# Patient Record
Sex: FEMALE | Race: WHITE | NOT HISPANIC OR LATINO | Employment: OTHER | URBAN - METROPOLITAN AREA
[De-identification: names, ages, dates, MRNs, and addresses within clinical notes are randomized per-mention and may not be internally consistent; named-entity substitution may affect disease eponyms.]

---

## 2021-12-01 PROBLEM — Z01.419 WELL WOMAN EXAM: Status: ACTIVE | Noted: 2021-12-01

## 2021-12-02 ENCOUNTER — TELEPHONE (OUTPATIENT)
Dept: OBSTETRICS AND GYNECOLOGY | Facility: CLINIC | Age: 56
End: 2021-12-02

## 2021-12-02 ENCOUNTER — OFFICE VISIT (OUTPATIENT)
Dept: OBSTETRICS AND GYNECOLOGY | Facility: CLINIC | Age: 56
End: 2021-12-02

## 2021-12-02 VITALS
BODY MASS INDEX: 24.75 KG/M2 | DIASTOLIC BLOOD PRESSURE: 80 MMHG | RESPIRATION RATE: 14 BRPM | SYSTOLIC BLOOD PRESSURE: 122 MMHG | WEIGHT: 175 LBS

## 2021-12-02 DIAGNOSIS — Z12.31 ENCOUNTER FOR SCREENING MAMMOGRAM FOR MALIGNANT NEOPLASM OF BREAST: ICD-10-CM

## 2021-12-02 DIAGNOSIS — Z01.419 WELL WOMAN EXAM: Primary | ICD-10-CM

## 2021-12-02 DIAGNOSIS — Z71.85 VACCINE COUNSELING: ICD-10-CM

## 2021-12-02 PROCEDURE — 99386 PREV VISIT NEW AGE 40-64: CPT | Performed by: OBSTETRICS & GYNECOLOGY

## 2021-12-02 RX ORDER — ESTRADIOL 0.03 MG/D
FILM, EXTENDED RELEASE TRANSDERMAL
COMMUNITY
End: 2021-12-02

## 2021-12-02 RX ORDER — ESTRADIOL 1 MG/1
1 TABLET ORAL DAILY
Qty: 90 TABLET | Refills: 4 | Status: SHIPPED | OUTPATIENT
Start: 2021-12-02 | End: 2022-09-04 | Stop reason: SDUPTHER

## 2021-12-02 RX ORDER — RIZATRIPTAN BENZOATE 10 MG/1
TABLET ORAL
COMMUNITY

## 2021-12-02 RX ORDER — PROGESTERONE 100 MG/1
CAPSULE ORAL
COMMUNITY
End: 2021-12-02

## 2021-12-02 RX ORDER — ALPRAZOLAM 0.5 MG/1
TABLET ORAL
COMMUNITY
Start: 2021-11-29 | End: 2022-04-21 | Stop reason: SDUPTHER

## 2021-12-02 RX ORDER — IBUPROFEN 800 MG/1
TABLET ORAL
COMMUNITY
End: 2022-06-08

## 2021-12-02 RX ORDER — ESTROGEN,CON/M-PROGEST ACET 0.625-2.5
1 TABLET ORAL DAILY
Qty: 90 TABLET | Refills: 4 | Status: SHIPPED | OUTPATIENT
Start: 2021-12-02 | End: 2021-12-02

## 2021-12-02 RX ORDER — ZOLPIDEM TARTRATE 10 MG/1
TABLET ORAL
COMMUNITY
End: 2022-04-21 | Stop reason: SDUPTHER

## 2021-12-02 RX ORDER — TOPIRAMATE 50 MG/1
TABLET, FILM COATED ORAL
COMMUNITY

## 2021-12-02 RX ORDER — PROGESTERONE 100 MG/1
100 CAPSULE ORAL DAILY
Qty: 90 CAPSULE | Refills: 4 | Status: SHIPPED | OUTPATIENT
Start: 2021-12-02 | End: 2022-03-09 | Stop reason: SINTOL

## 2021-12-02 RX ORDER — FLUTICASONE PROPIONATE 50 MCG
SPRAY, SUSPENSION (ML) NASAL
COMMUNITY

## 2021-12-02 NOTE — PATIENT INSTRUCTIONS
Zoster Vaccine, Recombinant injection (Shingrix)      What is this medicine?  ZOSTER VACCINE (ZOS ter vak SEEN) is used to prevent shingles in adults 50 years old and over. This vaccine is not used to treat shingles or nerve pain from shingles.  This medicine may be used for other purposes; ask your health care provider or pharmacist if you have questions.    What should I tell my health care provider before I take this medicine?  They need to know if you have any of these conditions:  • blood disorders or disease  • cancer like leukemia or lymphoma  • immune system problems or therapy  • an unusual or allergic reaction to vaccines, other medications, foods, dyes, or preservatives  • pregnant or trying to get pregnant  • breast-feeding    How should I use this medicine?  1. This vaccine is for injection in a muscle. It is given by a health care professional.  2. The vaccine series requires 2 doses for full effect  3. The second dose should be given somewhere between 2-6 months after the initial injection is given.    What if I miss a dose?  • Keep appointments for follow-up (booster) doses as directed. It is important not to miss your dose.   • Call your doctor or health care professional if you are unable to keep an appointment.    What may interact with this medicine?  • medicines that suppress your immune system  • medicines to treat cancer  • steroid medicines like prednisone or cortisone    This list may not describe all possible interactions. Give your health care provider a list of all the medicines, herbs, non-prescription drugs, or dietary supplements you use. Also tell them if you smoke, drink alcohol, or use illegal drugs. Some items may interact with your medicine.    What should I watch for while using this medicine?  • Visit your doctor for regular check ups.  • This vaccine, like all vaccines, may not fully protect everyone.    What side effects may I notice from receiving this medicine?  Side effects  that you should report to your doctor or health care professional as soon as possible:  • allergic reactions like skin rash, itching or hives, swelling of the face, lips, or tongue  • breathing problems  • Side effects that usually do not require medical attention (report these to your doctor or health care professional if they continue or are bothersome):  • chills  • headache  • fever  • nausea, vomiting  • redness, warmth, pain, swelling or itching at site where injected  • tiredness  This list may not describe all possible side effects. Call your doctor for medical advice about side effects. You may report side effects to FDA at 7-620-IYY-5436.    Where should I keep my medicine?  This vaccine is only given in a clinic, pharmacy, doctor's office, or other health care setting and will not be stored at home.  NOTE: This sheet is a summary. It may not cover all possible information. If you have questions about this medicine, talk to your doctor, pharmacist, or health care provider.  © 2019 [employer]/Gold Standard (2018-07-30 13:20:30)

## 2021-12-02 NOTE — TELEPHONE ENCOUNTER
Sending in a prescription for a generic estrogen tablet along with the same progesterone tablet she used to take.    New Medications Ordered This Visit   Medications   • estradiol (Estrace) 1 MG tablet     Sig: Take 1 tablet by mouth Daily.     Dispense:  90 tablet     Refill:  4   • Progesterone (Prometrium) 100 MG capsule     Sig: Take 1 capsule by mouth Daily.     Dispense:  90 capsule     Refill:  4

## 2021-12-02 NOTE — PROGRESS NOTES
Subjective   Chief Complaint   Patient presents with   • Gynecologic Exam     Tracey Shukla is a 56 y.o. year old  menopausal female presenting to be seen for her annual exam. She comes today as a new patient. She is having no problems. She does take hormone replacement therapy. She is on compounded estrogen as well as progesterone capsules. She is unsure why she is taking the combination of by asked with testosterone added. Previously she was getting gynecologic care in [place], [place]. She has had a Pap and a mammogram both within the last couple of years. She not sure when her last colonoscopy was done but she knows she is up-to-date.    There have been no bleeding over the year.    SEXUAL Hx:  She is currently sexually active.  In the past year there there has been NO new sexual partners.    Condoms are never used.  She would not like to be screened for STD's at today's exam.  [place] is painful: no  HEALTH Hx:  She exercises regularly: yes.  She wears her seat belt: yes.  She has concerns about domestic violence: no.  She has noticed changes in height: no.  OTHER THINGS SHE WANTS TO DISCUSS TODAY:  Nothing else    The following portions of the patient's history were reviewed and updated as appropriate:problem list, current medications, allergies, past family history, past medical history, past social history and past surgical history.    Social History    Tobacco Use      Smoking status: Never Smoker      Smokeless tobacco: Not on file      Review of Systems  Constitutional POS: nothing reported    NEG: anorexia or night sweats   Genitourinary POS: nothing reported    NEG: dysuria or hematuria      Gastointestinal POS: nothing reported    NEG: bloating, change in bowel habits, melena or reflux symptoms   Integument POS: nothing reported    NEG: moles that are changing in size, shape, color or rashes   Breast POS: nothing reported    NEG: persistent breast lump, skin dimpling or nipple discharge         Objective   /80   Resp 14   Wt 79.4 kg (175 lb)   Breastfeeding No   BMI 24.75 kg/m²     General:  well developed; well nourished  no acute distress   Skin:  No suspicious lesions seen   Thyroid: normal to inspection and palpation   Breasts:  Examined in supine position  Symmetric without masses or skin dimpling  Nipples normal without inversion, lesions or discharge  There are no palpable axillary nodes   Abdomen: soft, non-tender; no masses  no umbilical or inguinal hernias are present  no hepato-splenomegaly   Pelvis: Clinical staff was present for exam  External genitalia:  normal appearance of the external genitalia including Bartholin's and Tavernier's glands.  :  urethral meatus normal;  Vaginal:  normal pink mucosa without prolapse or lesions.  Cervix:  normal appearance.  Uterus:  normal size, shape and consistency.  Adnexa:  normal bimanual exam of the adnexa.  Rectal:  digital rectal exam not performed; anus visually normal appearing.        Assessment   1. Normal GYN exam in menopause  2. Menopausal female currently on HRT - without significant symptoms affecting activities of daily living  3. She is up to date on all relevant gynecologic and colorectal screenings     Plan   1. Pap was not done today.  I explained to Tracey that the recommendations for Pap smear interval in a low risk patient has lengthened to 3 years time.  I told Tracey she still needs to be seen in our office yearly for a full physical including breast and pelvic exam.  2. She was encouraged to get yearly mammograms.  She should report any palpable breast lump(s) or skin changes regardless of mammographic findings.  I explained to Tracey that notification regarding her mammogram results will come from the center performing the study.  Our office will not be routinely calling with mammogram results.  It is her responsibility to make sure that the results from the mammogram are communicated to her by the breast center.  If she  has any questions about the results, she is welcome to call our office anytime.  3. Data from the women's health initiative study was reviewed.  With Prempro versus placebo, it was explained that there was no significant difference in the rates of stroke, heart attack or breast cancer until greater than 5 years of use.  The magnitude of risk after 5 years of use was approximately 7 additional cases of breast cancer, heart attack and stroke per 10,000 women years of use.  When the data was reanalyzed including only those women initiating HRT within close proximity of the natural menopause, only the rate of stroke persisted.  Furthermore, data is only available for Prempro.  Any extrapolation to other forms of hormone therapy cannot accurately say whether the risks are equal, less for more than with the medications studied.  Ultimately, if she wishes to use hormone replacement therapy, the goal would be to try to reduce it to the lowest possible dose.  Preferably, the goal would be total withdrawal of systemic hormone therapy by 5 years.  There is no good prospective data to quantify the risk for use of HRT for greater than 6 years.  4. The following data needs to be obtained to update her medical records: last PAP, last mammogram and last colonoscopy.  5. I discussed with Tracey that she may be behind on needed vaccinations for Shingles [Shingrix].  She may be able to obtain these vaccinations at her local pharmacy OR speak about obtaining them with her primary care.  If she does obtain her vaccines, I have asked Tracey to let us know the date each vaccine was obtained so that her medical record could be updated in our system.  6. The importance of keeping all planned follow-up and taking all medications as prescribed was emphasized.  7. Follow up for annual exam 1 year    New Medications Ordered This Visit   Medications   • estrogen, conjugated,-medroxyprogesterone (Prempro) 0.625-2.5 MG per tablet     Sig: Take 1  tablet by mouth Daily.     Dispense:  90 tablet     Refill:  4          This note was electronically signed.    Jose Maria Hines M.D.  December 2, 2021    Part of this note may be an electronic transcription/translation of spoken language to printed text using the Dragon Dictation System.

## 2021-12-02 NOTE — TELEPHONE ENCOUNTER
Spoke to pt. Pt states that she cannot do anything that is going to be really expensive so she would like to do the oral estradiol combined with an oral progesterone.

## 2021-12-02 NOTE — TELEPHONE ENCOUNTER
Please call patient and let her know we received notification from her pharmacy that Prempro was not a covered prescription.  We can file a prior authorization and see if we can get that covered.  It may be easier to use oral estradiol combined with an oral progesterone.  It may be less expensive for her to go that route.  After talking to her if that is what she wants me to do, let me know and I will send appropriate prescription.  Thank you

## 2022-03-08 ENCOUNTER — TELEPHONE (OUTPATIENT)
Dept: OBSTETRICS AND GYNECOLOGY | Facility: CLINIC | Age: 57
End: 2022-03-08

## 2022-03-08 NOTE — TELEPHONE ENCOUNTER
PLEASE CALL, HAS SOME QUESTIONS ABOUT HER HORMONE REPLACEMENT MEDS-HAVING A LOT OF MENOPAUSAL SYMPTOMS SINCE CHANGING

## 2022-03-08 NOTE — TELEPHONE ENCOUNTER
This is roughly equivalent dose of the hormone she was taking in Prempro.  It is 2 different forms of hormone replacement therapy.  The estrogen and progesterone are a little different in the 2 medicines.  If she would like, we can prescribe Prempro again.  Just let me know

## 2022-03-08 NOTE — TELEPHONE ENCOUNTER
Patient called stating since change of hormones in December she is having increased mood swings, moodiness, irritable, breast tenderness and low energy.  Currently taking Estradiol 1 mg and Progesterone 100 mg.    [facility]

## 2022-03-09 ENCOUNTER — TELEPHONE (OUTPATIENT)
Dept: OBSTETRICS AND GYNECOLOGY | Facility: CLINIC | Age: 57
End: 2022-03-09

## 2022-03-09 RX ORDER — MEDROXYPROGESTERONE ACETATE 2.5 MG/1
2.5 TABLET ORAL DAILY
Qty: 90 TABLET | Refills: 1 | Status: SHIPPED | OUTPATIENT
Start: 2022-03-09 | End: 2022-09-04 | Stop reason: SDUPTHER

## 2022-03-09 NOTE — TELEPHONE ENCOUNTER
Might suggest trying Provera instead of Prometrium and keeping the estradiol unchanged.  If she wants me to do that, let me know

## 2022-03-09 NOTE — TELEPHONE ENCOUNTER
Attempted to call patient back to discuss her concerns about hormone replacement therapy.  Again left a message on the machine and asked her to call me back.

## 2022-03-10 NOTE — TELEPHONE ENCOUNTER
Spoke with patient and she stated that Dr. Hines had already been in contact with her and has already sent in the Provera.

## 2022-04-21 ENCOUNTER — OFFICE VISIT (OUTPATIENT)
Dept: FAMILY MEDICINE CLINIC | Facility: CLINIC | Age: 57
End: 2022-04-21

## 2022-04-21 VITALS
BODY MASS INDEX: 24.81 KG/M2 | OXYGEN SATURATION: 98 % | SYSTOLIC BLOOD PRESSURE: 112 MMHG | HEIGHT: 71 IN | DIASTOLIC BLOOD PRESSURE: 82 MMHG | WEIGHT: 177.2 LBS | TEMPERATURE: 97.7 F | RESPIRATION RATE: 15 BRPM | HEART RATE: 82 BPM

## 2022-04-21 DIAGNOSIS — Z79.899 HIGH RISK MEDICATION USE: ICD-10-CM

## 2022-04-21 DIAGNOSIS — F41.9 ANXIETY: ICD-10-CM

## 2022-04-21 DIAGNOSIS — G43.909 MIGRAINE WITHOUT STATUS MIGRAINOSUS, NOT INTRACTABLE, UNSPECIFIED MIGRAINE TYPE: ICD-10-CM

## 2022-04-21 DIAGNOSIS — M62.838 MUSCLE SPASM: ICD-10-CM

## 2022-04-21 DIAGNOSIS — F51.01 PRIMARY INSOMNIA: Primary | ICD-10-CM

## 2022-04-21 LAB
POC AMPHETAMINES: NEGATIVE
POC BARBITURATES: NEGATIVE
POC BENZODIAZEPHINES: NEGATIVE
POC COCAINE: NEGATIVE
POC METHADONE: NEGATIVE
POC METHAMPHETAMINE SCREEN URINE: NEGATIVE
POC OPIATES: NEGATIVE
POC OXYCODONE: NEGATIVE
POC PHENCYCLIDINE: NEGATIVE
POC PROPOXYPHENE: NEGATIVE
POC THC: NEGATIVE
POC TRICYCLIC ANTIDEPRESSANTS: NEGATIVE

## 2022-04-21 PROCEDURE — 99214 OFFICE O/P EST MOD 30 MIN: CPT | Performed by: FAMILY MEDICINE

## 2022-04-21 PROCEDURE — 80305 DRUG TEST PRSMV DIR OPT OBS: CPT | Performed by: FAMILY MEDICINE

## 2022-04-21 RX ORDER — ZOLPIDEM TARTRATE 10 MG/1
10 TABLET ORAL NIGHTLY PRN
Qty: 30 TABLET | Refills: 2 | Status: SHIPPED | OUTPATIENT
Start: 2022-04-21 | End: 2022-10-18 | Stop reason: SDUPTHER

## 2022-04-21 RX ORDER — CYCLOBENZAPRINE HCL 10 MG
10 TABLET ORAL NIGHTLY PRN
Qty: 30 TABLET | Refills: 0 | Status: SHIPPED | OUTPATIENT
Start: 2022-04-21 | End: 2022-10-18 | Stop reason: SDUPTHER

## 2022-04-21 RX ORDER — PROGESTERONE 100 MG/1
1 CAPSULE ORAL DAILY
COMMUNITY
End: 2022-09-06

## 2022-04-21 RX ORDER — CYCLOBENZAPRINE HCL 10 MG
1 TABLET ORAL AS NEEDED
COMMUNITY
End: 2022-04-21 | Stop reason: SDUPTHER

## 2022-04-21 RX ORDER — ALPRAZOLAM 0.5 MG/1
0.5 TABLET ORAL 3 TIMES DAILY PRN
Qty: 60 TABLET | Refills: 0 | Status: SHIPPED | OUTPATIENT
Start: 2022-04-21 | End: 2022-10-07 | Stop reason: SDUPTHER

## 2022-04-21 RX ORDER — FREMANEZUMAB-VFRM 225 MG/1.5ML
225 INJECTION SUBCUTANEOUS
COMMUNITY

## 2022-04-21 NOTE — PROGRESS NOTES
New Patient Office Visit      Patient Name: Tracey Shukla  : 1965   MRN: [id number]     Chief Complaint:    Chief Complaint   Patient presents with   • Anxiety     Pt requests med refills        History of Present Illness: Tracey Shukla is a 57 y.o. female who is here today to   follow-up on her insomnia, anxiety, migraines and also her muck boots have rubbed her left anterior leg and will get a little swollen.  She since is gotten some padding and there wore thicker socks and has gone down a little bit she just wants me to look at it rather quickly.  Still enjoys cleaning horse stalls    Subjective      Review of Systems:   Review of Systems   Constitutional: Negative for fatigue and fever.   Respiratory: Negative for cough and shortness of breath.    Cardiovascular: Negative for chest pain and palpitations.   Skin: Negative for rash.       Past Medical History:   Past Medical History:   Diagnosis Date   • Anxiety - PCP manages    • Diverticulosis    • Frozen shoulder     RIGHT   • Goiter    • H/O migraine    • Insomnia        Past Surgical History:   Past Surgical History:   Procedure Laterality Date   • CLOSED MANIPULATION SHOULDER Right    • COLONOSCOPY      10 YEARS   • HERNIA REPAIR     • INCISIONAL HERNIA REPAIR     • LAPAROSCOPIC SALPINGOOPHERECTOMY Left     Enlarged ovary - benign (Dr. Mir López)   • OOPHORECTOMY Left    • SHOULDER SURGERY Right     FROZEN SHOULDER SURGERY   • THYROID SURGERY      Goiter removed   • TUBAL ABDOMINAL LIGATION Bilateral        Family History:   Family History   Problem Relation Age of Onset   • Lung cancer Mother    • Depression Father        Social History:   Social History     Socioeconomic History   • Marital status:    Tobacco Use   • Smoking status: Never Smoker   • Smokeless tobacco: Never Used   Vaping Use   • Vaping Use: Never used   Substance and Sexual Activity   • Alcohol use: Yes     Alcohol/week: 1.0 standard drink      "Types: 1 Glasses of wine per week     Comment: daily   • Drug use: Never   • Sexual activity: Defer       Medications:     Current Outpatient Medications:   •  ALPRAZolam (XANAX) 0.5 MG tablet, Take 1 tablet by mouth 3 (Three) Times a Day As Needed for Anxiety., Disp: 60 tablet, Rfl: 0  •  cyclobenzaprine (FLEXERIL) 10 MG tablet, Take 1 tablet by mouth At Night As Needed for Muscle Spasms., Disp: 30 tablet, Rfl: 0  •  estradiol (Estrace) 1 MG tablet, Take 1 tablet by mouth Daily., Disp: 90 tablet, Rfl: 4  •  fluticasone (FLONASE) 50 MCG/ACT nasal spray, fluticasone propionate 50 mcg/actuation nasal spray,suspension, Disp: , Rfl:   •  Fremanezumab-vfrm (Ajovy) 225 MG/1.5ML solution auto-injector, Inject 225 mg under the skin into the appropriate area as directed Every 30 (Thirty) Days., Disp: , Rfl:   •  ibuprofen (ADVIL,MOTRIN) 800 MG tablet, ibuprofen 800 mg tablet, Disp: , Rfl:   •  medroxyPROGESTERone (Provera) 2.5 MG tablet, Take 1 tablet by mouth Daily., Disp: 90 tablet, Rfl: 1  •  Progesterone (PROMETRIUM) 100 MG capsule, Take 1 capsule by mouth Daily., Disp: , Rfl:   •  rizatriptan (MAXALT) 10 MG tablet, rizatriptan 10 mg tablet, Disp: , Rfl:   •  sertraline (ZOLOFT) 50 MG tablet, , Disp: , Rfl:   •  topiramate (TOPAMAX) 50 MG tablet, topiramate 50 mg tablet, Disp: , Rfl:   •  zolpidem (AMBIEN) 10 MG tablet, Take 1 tablet by mouth At Night As Needed for Sleep., Disp: 30 tablet, Rfl: 2    Allergies:   No Known Allergies    Objective     Physical Exam:  Vital Signs:   Vitals:    04/21/22 1313   BP: 112/82   BP Location: Left arm   Patient Position: Sitting   Cuff Size: Adult   Pulse: 82   Resp: 15   Temp: 97.7 °F (36.5 °C)   SpO2: 98%   Weight: 80.4 kg (177 lb 3.2 oz)   Height: 179.1 cm (70.5\")   PainSc: 0-No pain     Body mass index is 25.07 kg/m².        Physical Exam  Vitals and nursing note reviewed.   Constitutional:       Appearance: Normal appearance.   HENT:      Head: Normocephalic and atraumatic. "   Cardiovascular:      Rate and Rhythm: Normal rate and regular rhythm.   Pulmonary:      Effort: Pulmonary effort is normal.      Breath sounds: Normal breath sounds.   Musculoskeletal:         General: Normal range of motion.      Cervical back: Normal range of motion and neck supple.      Right lower leg: No edema.      Left lower leg: No edema.      Comments: She does have a small area of soft tissue swelling over the distal anterior tibia down by the ankle.  I think it is a resolving hematoma.  She will observe it and if is not better in a few weeks with taking off the pressure and consider getting rid of the boots will let me know   Skin:     General: Skin is warm and dry.   Neurological:      General: No focal deficit present.      Mental Status: She is alert.         Procedures    Assessment / Plan      Assessment/Plan:   Diagnoses and all orders for this visit:    1. Primary insomnia (Primary)  -     zolpidem (AMBIEN) 10 MG tablet; Take 1 tablet by mouth At Night As Needed for Sleep.  Dispense: 30 tablet; Refill: 2    2. Anxiety  -     ALPRAZolam (XANAX) 0.5 MG tablet; Take 1 tablet by mouth 3 (Three) Times a Day As Needed for Anxiety.  Dispense: 60 tablet; Refill: 0    3. High risk medication use  -     POC Urine Drug Screen, Triage    4. Muscle spasm  -     cyclobenzaprine (FLEXERIL) 10 MG tablet; Take 1 tablet by mouth At Night As Needed for Muscle Spasms.  Dispense: 30 tablet; Refill: 0         Meds refilled for her as requested.    Careful observation on the left leg if not better in a few weeks return.    We will give her a shot of Aimovig here that she brought with her.  For her migraines.  As she is stable on this medication.    Get UDS and consent form signed [place] is appropriate.  Follow-up in 3 months may do CBC and CMP at that point as last blood work was done in December.       Follow Up:   Return in about 3 months (around 7/21/2022) for Recheck, Labs prior next visit.    Alfredito Rocha MD  [facility]  Primary Care [facility]

## 2022-05-23 ENCOUNTER — CLINICAL SUPPORT (OUTPATIENT)
Dept: FAMILY MEDICINE CLINIC | Facility: CLINIC | Age: 57
End: 2022-05-23

## 2022-05-23 DIAGNOSIS — G43.911 INTRACTABLE MIGRAINE WITH STATUS MIGRAINOSUS, UNSPECIFIED MIGRAINE TYPE: Primary | ICD-10-CM

## 2022-06-08 RX ORDER — IBUPROFEN 800 MG/1
TABLET ORAL
Qty: 90 TABLET | Refills: 0 | Status: SHIPPED | OUTPATIENT
Start: 2022-06-08 | End: 2022-10-24

## 2022-06-08 NOTE — TELEPHONE ENCOUNTER
Rx Refill Note    Requested Prescriptions     Pending Prescriptions Disp Refills   • ibuprofen (ADVIL,MOTRIN) 800 MG tablet [Pharmacy Med Name: IBUPROFEN 800 MG TABLET] 90 tablet 0     Sig: TAKE ONE TABLET BY MOUTH THREE TIMES A DAY WITH FOOD        Last office visit with prescribing clinician: 4/21/2022      Next office visit with prescribing clinician: N/A  Last labs: 04/21/2022  Last refill: 12/23/2021

## 2022-06-21 ENCOUNTER — CLINICAL SUPPORT (OUTPATIENT)
Dept: FAMILY MEDICINE CLINIC | Facility: CLINIC | Age: 57
End: 2022-06-21

## 2022-06-21 DIAGNOSIS — G43.909 MIGRAINE WITHOUT STATUS MIGRAINOSUS, NOT INTRACTABLE, UNSPECIFIED MIGRAINE TYPE: Primary | ICD-10-CM

## 2022-06-21 PROCEDURE — 99211 OFF/OP EST MAY X REQ PHY/QHP: CPT | Performed by: FAMILY MEDICINE

## 2022-07-22 ENCOUNTER — CLINICAL SUPPORT (OUTPATIENT)
Dept: FAMILY MEDICINE CLINIC | Facility: CLINIC | Age: 57
End: 2022-07-22
Payer: COMMERCIAL

## 2022-07-22 DIAGNOSIS — G43.909 MIGRAINE WITHOUT STATUS MIGRAINOSUS, NOT INTRACTABLE, UNSPECIFIED MIGRAINE TYPE: Primary | ICD-10-CM

## 2022-08-25 ENCOUNTER — CLINICAL SUPPORT (OUTPATIENT)
Dept: FAMILY MEDICINE CLINIC | Facility: CLINIC | Age: 57
End: 2022-08-25

## 2022-08-25 DIAGNOSIS — G43.911 INTRACTABLE MIGRAINE WITH STATUS MIGRAINOSUS, UNSPECIFIED MIGRAINE TYPE: Primary | ICD-10-CM

## 2022-08-25 PROCEDURE — 95115 IMMUNOTHERAPY ONE INJECTION: CPT | Performed by: FAMILY MEDICINE

## 2022-08-30 ENCOUNTER — OFFICE VISIT (OUTPATIENT)
Dept: FAMILY MEDICINE CLINIC | Facility: CLINIC | Age: 57
End: 2022-08-30

## 2022-08-30 VITALS — OXYGEN SATURATION: 99 % | HEART RATE: 68 BPM | TEMPERATURE: 97.5 F

## 2022-08-30 DIAGNOSIS — R05.9 COUGH: Primary | ICD-10-CM

## 2022-08-30 DIAGNOSIS — J01.10 ACUTE NON-RECURRENT FRONTAL SINUSITIS: ICD-10-CM

## 2022-08-30 LAB
EXPIRATION DATE: NORMAL
FLUAV AG UPPER RESP QL IA.RAPID: NOT DETECTED
FLUBV AG UPPER RESP QL IA.RAPID: NOT DETECTED
INTERNAL CONTROL: NORMAL
Lab: NORMAL
SARS-COV-2 AG UPPER RESP QL IA.RAPID: NOT DETECTED

## 2022-08-30 PROCEDURE — 99214 OFFICE O/P EST MOD 30 MIN: CPT | Performed by: FAMILY MEDICINE

## 2022-08-30 PROCEDURE — 87428 SARSCOV & INF VIR A&B AG IA: CPT | Performed by: FAMILY MEDICINE

## 2022-08-30 RX ORDER — FLUCONAZOLE 150 MG/1
150 TABLET ORAL ONCE
Qty: 1 TABLET | Refills: 0 | Status: SHIPPED | OUTPATIENT
Start: 2022-08-30 | End: 2022-08-30

## 2022-08-30 RX ORDER — GUAIFENESIN AND CODEINE PHOSPHATE 100; 10 MG/5ML; MG/5ML
SOLUTION ORAL
Qty: 240 ML | Refills: 0 | Status: SHIPPED | OUTPATIENT
Start: 2022-08-30 | End: 2022-10-18

## 2022-08-30 RX ORDER — AMOXICILLIN AND CLAVULANATE POTASSIUM 875; 125 MG/1; MG/1
1 TABLET, FILM COATED ORAL 2 TIMES DAILY
Qty: 20 TABLET | Refills: 0 | Status: SHIPPED | OUTPATIENT
Start: 2022-08-30 | End: 2022-10-18

## 2022-08-30 NOTE — PROGRESS NOTES
Follow Up Office Visit      Patient Name: Tracey Shukla  : 1965   MRN: [id number]     Chief Complaint:    Chief Complaint   Patient presents with   • URI     +1 week       History of Present Illness: Tracey Shukla is a 57 y.o. female who is here today to   evaluated for cough and drainage sinus pressure going on over a week now just cannot get better she is tried Mucinex she is tried Stahist she is tried Cherry-Winter she is tried Robitussin all the over-the-counter medicines.  Just not getting better.  Her COVID swab and flu swab was negative today patient examined in the parking lot.  She denies any fever mostly sinus issue and congestion drainage with some cough keeping her awake at night.    Subjective      Review of Systems:   Review of Systems    Past Medical History:   Past Medical History:   Diagnosis Date   • Anxiety - PCP manages    • Diverticulosis    • Frozen shoulder     RIGHT   • Goiter    • H/O migraine    • Insomnia        Past Surgical History:   Past Surgical History:   Procedure Laterality Date   • CLOSED MANIPULATION SHOULDER Right    • COLONOSCOPY      10 YEARS   • HERNIA REPAIR     • INCISIONAL HERNIA REPAIR     • LAPAROSCOPIC SALPINGOOPHERECTOMY Left     Enlarged ovary - benign (Dr. Mir López)   • OOPHORECTOMY Left    • SHOULDER SURGERY Right     FROZEN SHOULDER SURGERY   • THYROID SURGERY      Goiter removed   • TUBAL ABDOMINAL LIGATION Bilateral        Family History:   Family History   Problem Relation Age of Onset   • Lung cancer Mother    • Depression Father        Social History:   Social History     Socioeconomic History   • Marital status:    Tobacco Use   • Smoking status: Never Smoker   • Smokeless tobacco: Never Used   Vaping Use   • Vaping Use: Never used   Substance and Sexual Activity   • Alcohol use: Yes     Alcohol/week: 1.0 standard drink     Types: 1 Glasses of wine per week     Comment: daily   • Drug use: Never   • Sexual  activity: Defer       Medications:     Current Outpatient Medications:   •  ALPRAZolam (XANAX) 0.5 MG tablet, Take 1 tablet by mouth 3 (Three) Times a Day As Needed for Anxiety., Disp: 60 tablet, Rfl: 0  •  amoxicillin-clavulanate (Augmentin) 875-125 MG per tablet, Take 1 tablet by mouth 2 (Two) Times a Day., Disp: 20 tablet, Rfl: 0  •  cyclobenzaprine (FLEXERIL) 10 MG tablet, Take 1 tablet by mouth At Night As Needed for Muscle Spasms., Disp: 30 tablet, Rfl: 0  •  estradiol (Estrace) 1 MG tablet, Take 1 tablet by mouth Daily., Disp: 90 tablet, Rfl: 4  •  fluconazole (Diflucan) 150 MG tablet, Take 1 tablet by mouth 1 (One) Time for 1 dose., Disp: 1 tablet, Rfl: 0  •  fluticasone (FLONASE) 50 MCG/ACT nasal spray, fluticasone propionate 50 mcg/actuation nasal spray,suspension, Disp: , Rfl:   •  Fremanezumab-vfrm (Ajovy) 225 MG/1.5ML solution auto-injector, Inject 225 mg under the skin into the appropriate area as directed Every 30 (Thirty) Days., Disp: , Rfl:   •  guaiFENesin-codeine (GUAIFENESIN AC) 100-10 MG/5ML liquid, 10 mL p.o. nightly as needed cough, Disp: 240 mL, Rfl: 0  •  ibuprofen (ADVIL,MOTRIN) 800 MG tablet, TAKE ONE TABLET BY MOUTH THREE TIMES A DAY WITH FOOD, Disp: 90 tablet, Rfl: 0  •  medroxyPROGESTERone (Provera) 2.5 MG tablet, Take 1 tablet by mouth Daily., Disp: 90 tablet, Rfl: 1  •  Progesterone (PROMETRIUM) 100 MG capsule, Take 1 capsule by mouth Daily., Disp: , Rfl:   •  rizatriptan (MAXALT) 10 MG tablet, rizatriptan 10 mg tablet, Disp: , Rfl:   •  sertraline (ZOLOFT) 50 MG tablet, , Disp: , Rfl:   •  topiramate (TOPAMAX) 50 MG tablet, topiramate 50 mg tablet, Disp: , Rfl:   •  zolpidem (AMBIEN) 10 MG tablet, Take 1 tablet by mouth At Night As Needed for Sleep., Disp: 30 tablet, Rfl: 2    Current Facility-Administered Medications:   •  Fremanezumab-vfrm solution auto-injector 1 dose, 1 dose, Subcutaneous, Q30 Days, Alfredito Rocha MD, 1 dose at 08/25/22 1318  •  Fremanezumab-vfrm solution  auto-injector 225 mg, 225 mg, Subcutaneous, Q30 Days, Alfredito Rocha MD, 225 mg at 05/23/22 1436  •  Fremanezumab-vfrm solution auto-injector 225 mg, 225 mg, Subcutaneous, Q30 Days, Alfredito Rocha MD, 225 mg at 05/23/22 1444    Allergies:   No Known Allergies    Objective     Physical Exam:  Vital Signs:   Vitals:    08/30/22 1029   Pulse: 68   Temp: 97.5 °F (36.4 °C)   TempSrc: Infrared   SpO2: 99%     There is no height or weight on file to calculate BMI.     Physical Exam  Vitals and nursing note reviewed.   Constitutional:       Appearance: Normal appearance.      Comments: Alert oriented pleasant white female with lots of coughing no acute distress   HENT:      Head: Normocephalic and atraumatic.      Right Ear: Tympanic membrane and ear canal normal.      Left Ear: Tympanic membrane and ear canal normal.      Mouth/Throat:      Mouth: Mucous membranes are moist.      Pharynx: Oropharynx is clear. No posterior oropharyngeal erythema.   Cardiovascular:      Rate and Rhythm: Normal rate and regular rhythm.   Pulmonary:      Effort: Pulmonary effort is normal.      Breath sounds: Normal breath sounds.   Musculoskeletal:      Cervical back: Normal range of motion and neck supple. No rigidity or tenderness.      Right lower leg: No edema.      Left lower leg: No edema.   Lymphadenopathy:      Cervical: No cervical adenopathy.   Skin:     General: Skin is warm and dry.   Neurological:      Mental Status: She is alert.         Procedures    PHQ-9 Total Score:       Assessment / Plan      Assessment/Plan:   Diagnoses and all orders for this visit:    1. Cough (Primary)  -     POCT SARS-CoV-2 Antigen HANS + Flu  -     guaiFENesin-codeine (GUAIFENESIN AC) 100-10 MG/5ML liquid; 10 mL p.o. nightly as needed cough  Dispense: 240 mL; Refill: 0    2. Acute non-recurrent frontal sinusitis  -     amoxicillin-clavulanate (Augmentin) 875-125 MG per tablet; Take 1 tablet by mouth 2 (Two) Times a Day.  Dispense: 20 tablet; Refill:  0    Other orders  -     fluconazole (Diflucan) 150 MG tablet; Take 1 tablet by mouth 1 (One) Time for 1 dose.  Dispense: 1 tablet; Refill: 0         Augmentin followed by fluconazole for yeast infection if she develops that rest fluids Tylenol she can use Mucinex DM in the day and then the Robitussin with codeine at night for cough if not any better she will return or any worse return sooner or go to the ER.  Her COVID/flu swabs were negative.      Follow Up:   No follow-ups on file.        Alfredito Rocha MD  [facility]   Portions of note created with Dragon voice recognition technology

## 2022-09-06 RX ORDER — ESTRADIOL 1 MG/1
1 TABLET ORAL DAILY
Qty: 90 TABLET | Refills: 4 | Status: SHIPPED | OUTPATIENT
Start: 2022-09-06 | End: 2022-12-08 | Stop reason: SDUPTHER

## 2022-09-06 RX ORDER — MEDROXYPROGESTERONE ACETATE 2.5 MG/1
2.5 TABLET ORAL DAILY
Qty: 90 TABLET | Refills: 1 | Status: SHIPPED | OUTPATIENT
Start: 2022-09-06 | End: 2022-12-08 | Stop reason: SDUPTHER

## 2022-09-06 NOTE — TELEPHONE ENCOUNTER
Rx Refill Note    Requested Prescriptions     Pending Prescriptions Disp Refills   • sertraline (ZOLOFT) 50 MG tablet [Pharmacy Med Name: SERTRALINE HCL 50 MG TABLET] 90 tablet 0     Sig: TAKE ONE TABLET BY MOUTH DAILY        Last office visit with prescribing clinician: 8/30/2022      Next office visit with prescribing clinician: 10/18/2022   Last labs:   Last refill: 06/14/2021   Pharmacy (be sure to add in Epic). correct

## 2022-09-22 ENCOUNTER — CLINICAL SUPPORT (OUTPATIENT)
Dept: FAMILY MEDICINE CLINIC | Facility: CLINIC | Age: 57
End: 2022-09-22

## 2022-09-22 DIAGNOSIS — G43.901 MIGRAINE WITH STATUS MIGRAINOSUS, NOT INTRACTABLE, UNSPECIFIED MIGRAINE TYPE: Primary | ICD-10-CM

## 2022-10-07 DIAGNOSIS — F41.9 ANXIETY: ICD-10-CM

## 2022-10-07 RX ORDER — ALPRAZOLAM 0.5 MG/1
0.5 TABLET ORAL 3 TIMES DAILY PRN
Qty: 60 TABLET | Refills: 0 | Status: SHIPPED | OUTPATIENT
Start: 2022-10-07 | End: 2023-01-13 | Stop reason: SDUPTHER

## 2022-10-07 NOTE — TELEPHONE ENCOUNTER
Caller: Tracey Shukla    Relationship: Self    Best call back number: [phone]    Requested Prescriptions:   Requested Prescriptions     Pending Prescriptions Disp Refills   • ALPRAZolam (XANAX) 0.5 MG tablet 60 tablet 0     Sig: Take 1 tablet by mouth 3 (Three) Times a Day As Needed for Anxiety.        Pharmacy where request should be sent: [facility] [id number] - [place], [place] - [address] AT [facility] [address] - [phone]  - [phone] FX     Additional details provided by patient: COMPLETELY OUT OF MEDICATION AND HAS APPT ON 10/18/22    Does the patient have less than a 3 day supply:  [x] Yes  [] No    Joni Cuello Rep   10/07/22 11:32 EDT

## 2022-10-07 NOTE — TELEPHONE ENCOUNTER
Rx Refill Note    Requested Prescriptions     Pending Prescriptions Disp Refills   • ALPRAZolam (XANAX) 0.5 MG tablet 60 tablet 0     Sig: Take 1 tablet by mouth 3 (Three) Times a Day As Needed for Anxiety.        Last office visit with prescribing clinician: 8/30/2022      Next office visit with prescribing clinician: 10/18/2022   Last labs:   Last refill: 04/21/2022   Pharmacy (be sure to add in Epic). Correct    Has apt on 10/18/2022

## 2022-10-18 ENCOUNTER — OFFICE VISIT (OUTPATIENT)
Dept: FAMILY MEDICINE CLINIC | Facility: CLINIC | Age: 57
End: 2022-10-18

## 2022-10-18 VITALS
BODY MASS INDEX: 25.06 KG/M2 | HEIGHT: 71 IN | DIASTOLIC BLOOD PRESSURE: 90 MMHG | HEART RATE: 81 BPM | WEIGHT: 179 LBS | OXYGEN SATURATION: 97 % | RESPIRATION RATE: 15 BRPM | TEMPERATURE: 97.1 F | SYSTOLIC BLOOD PRESSURE: 134 MMHG

## 2022-10-18 DIAGNOSIS — Z79.899 ENCOUNTER FOR LONG-TERM (CURRENT) USE OF OTHER MEDICATIONS: ICD-10-CM

## 2022-10-18 DIAGNOSIS — Z00.00 ANNUAL PHYSICAL EXAM: Primary | ICD-10-CM

## 2022-10-18 DIAGNOSIS — Z11.59 ENCOUNTER FOR HEPATITIS C SCREENING TEST FOR LOW RISK PATIENT: ICD-10-CM

## 2022-10-18 DIAGNOSIS — F51.01 PRIMARY INSOMNIA: ICD-10-CM

## 2022-10-18 DIAGNOSIS — M62.838 MUSCLE SPASM: ICD-10-CM

## 2022-10-18 DIAGNOSIS — E78.2 MIXED HYPERLIPIDEMIA: ICD-10-CM

## 2022-10-18 PROCEDURE — 80305 DRUG TEST PRSMV DIR OPT OBS: CPT | Performed by: FAMILY MEDICINE

## 2022-10-18 PROCEDURE — 99396 PREV VISIT EST AGE 40-64: CPT | Performed by: FAMILY MEDICINE

## 2022-10-18 RX ORDER — ZOLPIDEM TARTRATE 10 MG/1
10 TABLET ORAL NIGHTLY PRN
Qty: 30 TABLET | Refills: 2 | Status: SHIPPED | OUTPATIENT
Start: 2022-10-18 | End: 2023-01-13 | Stop reason: SDUPTHER

## 2022-10-18 RX ORDER — CYCLOBENZAPRINE HCL 10 MG
10 TABLET ORAL NIGHTLY PRN
Qty: 30 TABLET | Refills: 0 | Status: SHIPPED | OUTPATIENT
Start: 2022-10-18

## 2022-10-18 NOTE — PROGRESS NOTES
Female Physical Note      Patient Name: Tracey Shukla  : 1965   MRN: [id number]     Chief Complaint:  No chief complaint on file.      History of Present Illness: Tracey Shukla is a 57 y.o. female who is here today for their annual health maintenance and physical.  She also needs refills on her Ambien for her sleep she generally takes just a half a tablet and uses alprazolam occasionally and is stable on that she still enjoys working with the horses and on  when she has a long day she does get a lot of back spasms and occasional charley horses to do so she uses Flexeril for that would like a refill.    She is also been substitute teaching some and is otherwise doing well    Review of Systems   Constitutional: Negative for fatigue and fever.   HENT: Negative for ear pain and sore throat.    Eyes: Negative for visual disturbance.   Respiratory: Negative for cough, chest tightness and shortness of breath.    Cardiovascular: Negative for chest pain and palpitations.   Gastrointestinal: Negative for abdominal pain, blood in stool, melena, constipation, diarrhea, nausea and vomiting.   Endocrine: Negative for cold intolerance and heat intolerance.   Genitourinary: Negative for dysuria and hematuria.   Musculoskeletal: Negative for back pain and joint swelling.   Skin: Negative for rash and wound.   Allergic/Immunologic: Negative for environmental allergies and food allergies.         Subjective      Review of Systems:   Review of Systems    Past Medical History, Social History, Family History and Care Team were all reviewed with patient and updated as appropriate.     Medications:     Current Outpatient Medications:   •  cyclobenzaprine (FLEXERIL) 10 MG tablet, Take 1 tablet by mouth At Night As Needed for Muscle Spasms., Disp: 30 tablet, Rfl: 0  •  zolpidem (AMBIEN) 10 MG tablet, Take 1 tablet by mouth At Night As Needed for Sleep., Disp: 30 tablet, Rfl: 2  •  ALPRAZolam (XANAX) 0.5 MG tablet, Take 1  tablet by mouth 3 (Three) Times a Day As Needed for Anxiety., Disp: 60 tablet, Rfl: 0  •  estradiol (Estrace) 1 MG tablet, Take 1 tablet by mouth Daily., Disp: 90 tablet, Rfl: 4  •  fluticasone (FLONASE) 50 MCG/ACT nasal spray, fluticasone propionate 50 mcg/actuation nasal spray,suspension, Disp: , Rfl:   •  Fremanezumab-vfrm (Ajovy) 225 MG/1.5ML solution auto-injector, Inject 225 mg under the skin into the appropriate area as directed Every 30 (Thirty) Days., Disp: , Rfl:   •  ibuprofen (ADVIL,MOTRIN) 800 MG tablet, TAKE ONE TABLET BY MOUTH THREE TIMES A DAY WITH FOOD, Disp: 90 tablet, Rfl: 0  •  medroxyPROGESTERone (Provera) 2.5 MG tablet, Take 1 tablet by mouth Daily., Disp: 90 tablet, Rfl: 1  •  rizatriptan (MAXALT) 10 MG tablet, rizatriptan 10 mg tablet, Disp: , Rfl:   •  sertraline (ZOLOFT) 50 MG tablet, TAKE ONE TABLET BY MOUTH DAILY, Disp: 90 tablet, Rfl: 0  •  topiramate (TOPAMAX) 50 MG tablet, topiramate 50 mg tablet, Disp: , Rfl:     Current Facility-Administered Medications:   •  Fremanezumab-vfrm solution auto-injector 1 dose, 1 dose, Subcutaneous, Q30 Days, Alfredito Rocha MD, 1 dose at 08/25/22 1318  •  Fremanezumab-vfrm solution auto-injector 225 mg, 225 mg, Subcutaneous, Q30 Days, Alfredito Rocha MD, 225 mg at 05/23/22 1436  •  Fremanezumab-vfrm solution auto-injector 225 mg, 225 mg, Subcutaneous, Q30 Days, Alfredito Rocha MD, 225 mg at 05/23/22 1444  •  Fremanezumab-vfrm solution prefilled syringe 225 mg, 225 mg, Subcutaneous, Q30 Days, Alfredito Rocha MD, 225 mg at 09/22/22 1405    Allergies:   No Known Allergies    I    Bone Density/DEXA: DEXA scan.  As per GYN    Depression: PHQ-2 Depression Screening  PHQ-9 Total Score:        Intimate partner violence: (Screen on initial visit, pregnant women, women with injuries, older adult with injury or evidence of neglect):  • Violence can be a problem in many people's lives, so I now ask every patient about trauma or abuse they may have experienced in a  "relationship.  • Stress/Safety - Do you feel safe in your relationship?  • Afraid/Abused - Have you ever been in a relationship where you were threatened, hurt, or afraid?  • Friend/Family - Are your friends aware you have been hurt?  • Emergency Plan - Do you have a safe place to go and the resources you need in an emergency?    Osteoporosis:   • Ost menopausal women < 65 with RF (advancing age, previous fracture, glucocorticoid therapy, parental hip fracture, low body weight, current cigarette smoking, excessive alcohol consumption, rheumatoid arthritis, secondary osteoporosis [hypogonadism/premature menopause, malabsorption, chronic liver disease, IBD]).  • All women 65 or older    Objective     Physical Exam:  Vital Signs:   Vitals:    10/18/22 1528   BP: 134/90   BP Location: Right arm   Patient Position: Sitting   Cuff Size: Adult   Pulse: 81   Resp: 15   Temp: 97.1 °F (36.2 °C)   TempSrc: Infrared   SpO2: 97%   Weight: 81.2 kg (179 lb)   Height: 179.1 cm (70.5\")   PainSc: 0-No pain     Body mass index is 25.32 kg/m².        Physical Exam  Vitals and nursing note reviewed.   Constitutional:       General: She is not in acute distress.     Appearance: Normal appearance.   HENT:      Head: Normocephalic and atraumatic.      Right Ear: Tympanic membrane, ear canal and external ear normal.      Left Ear: Tympanic membrane, ear canal and external ear normal.      Nose: Nose normal.      Mouth/Throat:      Mouth: Mucous membranes are moist.      Pharynx: Oropharynx is clear.   Eyes:      Extraocular Movements: Extraocular movements intact.      Conjunctiva/sclera: Conjunctivae normal.      Pupils: Pupils are equal, round, and reactive to light.   Neck:      Thyroid: No thyroid mass or thyroid tenderness.   Cardiovascular:      Rate and Rhythm: Normal rate and regular rhythm.      Heart sounds: Normal heart sounds.      Comments: RADIAL PULSES NML  Pulmonary:      Effort: Pulmonary effort is normal.      Breath " sounds: Normal breath sounds.   Abdominal:      General: Abdomen is flat. Bowel sounds are normal.      Palpations: Abdomen is soft. There is no mass.      Tenderness: There is no abdominal tenderness. There is no guarding or rebound.   Musculoskeletal:      Cervical back: Normal range of motion and neck supple.      Right lower leg: No edema.      Left lower leg: No edema.   Lymphadenopathy:      Cervical: No cervical adenopathy.   Skin:     General: Skin is warm and dry.      Findings: No rash.   Neurological:      General: No focal deficit present.      Mental Status: She is alert and oriented to person, place, and time.      Cranial Nerves: Cranial nerves are intact. No cranial nerve deficit.      Sensory: Sensation is intact.      Motor: Motor function is intact.      Deep Tendon Reflexes: Reflexes normal.      Comments: SYMMETRIC PATELLAR REFLEXES   Psychiatric:         Attention and Perception: Attention normal.         Mood and Affect: Mood normal.         Behavior: Behavior normal.         Thought Content: Thought content normal.         Judgment: Judgment normal.         Procedures    Assessment / Plan      Assessment/Plan:   Diagnoses and all orders for this visit:    1. Annual physical exam (Primary)  -     CBC Auto Differential; Future  -     Comprehensive Metabolic Panel; Future    2. Primary insomnia  -     zolpidem (AMBIEN) 10 MG tablet; Take 1 tablet by mouth At Night As Needed for Sleep.  Dispense: 30 tablet; Refill: 2    3. Muscle spasm  -     cyclobenzaprine (FLEXERIL) 10 MG tablet; Take 1 tablet by mouth At Night As Needed for Muscle Spasms.  Dispense: 30 tablet; Refill: 0    4. Mixed hyperlipidemia  -     Lipid Panel; Future    5. Encounter for hepatitis C screening test for low risk patient  -     Hepatitis C Antibody; Future         Meds refilled she stable and use she did sign the consent form [place] is appropriate     She will follow-up for blood work fasting in 1 November.  UDS is negative  which would be appropriate given her medicines and how she takes them.  Follow-up in 6 months for regular checkup make sure to go over blood work as directed or she can see it on MyChart.    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    Last tetanus was 6/13/2012 we will need to update this next time.    She may get Shingrix shot at the pharmacy.    She is debating getting the newest COVID-vaccine.  BMI is >= 25 and <30. (Overweight) The following options were offered after discussion;: exercise counseling/recommendations      Follow Up:   Return in about 24 weeks (around 4/4/2023) for Recheck.        Alfredito Rocha MD  [facility]  Portions of note created with Dragon voice recognition technology

## 2022-10-18 NOTE — PATIENT INSTRUCTIONS
Health Maintenance, Female  Adopting a healthy lifestyle and getting preventive care can go a long way to promote health and wellness. Talk with your health care provider about what schedule of regular examinations is right for you. This is a good chance for you to check in with your provider about disease prevention and staying healthy.  In between checkups, there are plenty of things you can do on your own. Experts have done a lot of research about which lifestyle changes and preventive measures are most likely to keep you healthy. Ask your health care provider for more information.  Weight and diet  Eat a healthy diet  Be sure to include plenty of vegetables, fruits, low-fat dairy products, and lean protein.  Do not eat a lot of foods high in solid fats, added sugars, or salt.  Get regular exercise. This is one of the most important things you can do for your health.  Most adults should exercise for at least 150 minutes each week. The exercise should increase your heart rate and make you sweat (moderate-intensity exercise).  Most adults should also do strengthening exercises at least twice a week. This is in addition to the moderate-intensity exercise.     Maintain a healthy weight  Body mass index (BMI) is a measurement that can be used to identify possible weight problems. It estimates body fat based on height and weight. Your health care provider can help determine your BMI and help you achieve or maintain a healthy weight.  For females 20 years of age and older:  A BMI below 18.5 is considered underweight.  A BMI of 18.5 to 24.9 is normal.  A BMI of 25 to 29.9 is considered overweight.  A BMI of 30 and above is considered obese.     Watch levels of cholesterol and blood lipids  You should start having your blood tested for lipids and cholesterol at 20 years of age, then have this test every 5 years.  You may need to have your cholesterol levels checked more often if:  Your lipid or cholesterol levels are  high.  You are older than 50 years of age.  You are at high risk for heart disease.     Cancer screening  Lung Cancer  Lung cancer screening is recommended for adults 55-80 years old who are at high risk for lung cancer because of a history of smoking.  A yearly low-dose CT scan of the lungs is recommended for people who:  Currently smoke.  Have quit within the past 15 years.  Have at least a 30-pack-year history of smoking. A pack year is smoking an average of one pack of cigarettes a day for 1 year.  Yearly screening should continue until it has been 15 years since you quit.  Yearly screening should stop if you develop a health problem that would prevent you from having lung cancer treatment.     Breast Cancer  Practice breast self-awareness. This means understanding how your breasts normally appear and feel.  It also means doing regular breast self-exams. Let your health care provider know about any changes, no matter how small.  If you are in your 20s or 30s, you should have a clinical breast exam (CBE) by a health care provider every 1-3 years as part of a regular health exam.  If you are 40 or older, have a CBE every year. Also consider having a breast X-ray (mammogram) every year.  If you have a family history of breast cancer, talk to your health care provider about genetic screening.  If you are at high risk for breast cancer, talk to your health care provider about having an MRI and a mammogram every year.  Breast cancer gene (BRCA) assessment is recommended for women who have family members with BRCA-related cancers. BRCA-related cancers include:  Breast.  Ovarian.  Tubal.  Peritoneal cancers.  Results of the assessment will determine the need for genetic counseling and BRCA1 and BRCA2 testing.     Cervical Cancer  Your health care provider may recommend that you be screened regularly for cancer of the pelvic organs (ovaries, uterus, and vagina). This screening involves a pelvic examination, including  checking for microscopic changes to the surface of your cervix (Pap test). You may be encouraged to have this screening done every 3 years, beginning at age 21.  For women ages 30-65, health care providers may recommend pelvic exams and Pap testing every 3 years, or they may recommend the Pap and pelvic exam, combined with testing for human papilloma virus (HPV), every 5 years. Some types of HPV increase your risk of cervical cancer. Testing for HPV may also be done on women of any age with unclear Pap test results.  Other health care providers may not recommend any screening for nonpregnant women who are considered low risk for pelvic cancer and who do not have symptoms. Ask your health care provider if a screening pelvic exam is right for you.  If you have had past treatment for cervical cancer or a condition that could lead to cancer, you need Pap tests and screening for cancer for at least 20 years after your treatment. If Pap tests have been discontinued, your risk factors (such as having a new sexual partner) need to be reassessed to determine if screening should resume. Some women have medical problems that increase the chance of getting cervical cancer. In these cases, your health care provider may recommend more frequent screening and Pap tests.     Colorectal Cancer  This type of cancer can be detected and often prevented.  Routine colorectal cancer screening usually begins at 50 years of age and continues through 75 years of age.  Your health care provider may recommend screening at an earlier age if you have risk factors for colon cancer.  Your health care provider may also recommend using home test kits to check for hidden blood in the stool.  A small camera at the end of a tube can be used to examine your colon directly (sigmoidoscopy or colonoscopy). This is done to check for the earliest forms of colorectal cancer.  Routine screening usually begins at age 50.  Direct examination of the colon should  be repeated every 5-10 years through 75 years of age. However, you may need to be screened more often if early forms of precancerous polyps or small growths are found.     Skin Cancer  Check your skin from head to toe regularly.  Tell your health care provider about any new moles or changes in moles, especially if there is a change in a mole's shape or color.  Also tell your health care provider if you have a mole that is larger than the size of a pencil eraser.  Always use sunscreen. Apply sunscreen liberally and repeatedly throughout the day.  Protect yourself by wearing long sleeves, pants, a wide-brimmed hat, and sunglasses whenever you are outside.     Heart disease, diabetes, and high blood pressure  High blood pressure causes heart disease and increases the risk of stroke. High blood pressure is more likely to develop in:  People who have blood pressure in the high end of the normal range (130-139/85-89 mm Hg).  People who are overweight or obese.  People who are .  If you are 18-39 years of age, have your blood pressure checked every 3-5 years. If you are 40 years of age or older, have your blood pressure checked every year. You should have your blood pressure measured twice--once when you are at a hospital or clinic, and once when you are not at a hospital or clinic. Record the average of the two measurements. To check your blood pressure when you are not at a hospital or clinic, you can use:  An automated blood pressure machine at a pharmacy.  A home blood pressure monitor.  If you are between 55 years and 79 years old, ask your health care provider if you should take aspirin to prevent strokes.  Have regular diabetes screenings. This involves taking a blood sample to check your fasting blood sugar level.  If you are at a normal weight and have a low risk for diabetes, have this test once every three years after 45 years of age.  If you are overweight and have a high risk for diabetes,  consider being tested at a younger age or more often.  Preventing infection  Hepatitis B  If you have a higher risk for hepatitis B, you should be screened for this virus. You are considered at high risk for hepatitis B if:  You were born in a country where hepatitis B is common. Ask your health care provider which countries are considered high risk.  Your parents were born in a high-risk country, and you have not been immunized against hepatitis B (hepatitis B vaccine).  You have HIV or AIDS.  You use needles to inject street drugs.  You live with someone who has hepatitis B.  You have had sex with someone who has hepatitis B.  You get hemodialysis treatment.  You take certain medicines for conditions, including cancer, organ transplantation, and autoimmune conditions.     Hepatitis C  Blood testing is recommended for:  Everyone born from 1945 through 1965.  Anyone with known risk factors for hepatitis C.     Sexually transmitted infections (STIs)  You should be screened for sexually transmitted infections (STIs) including gonorrhea and chlamydia if:  You are sexually active and are younger than 24 years of age.  You are older than 24 years of age and your health care provider tells you that you are at risk for this type of infection.  Your sexual activity has changed since you were last screened and you are at an increased risk for chlamydia or gonorrhea. Ask your health care provider if you are at risk.  If you do not have HIV, but are at risk, it may be recommended that you take a prescription medicine daily to prevent HIV infection. This is called pre-exposure prophylaxis (PrEP). You are considered at risk if:  You are sexually active and do not regularly use condoms or know the HIV status of your partner(s).  You take drugs by injection.  You are sexually active with a partner who has HIV.     Talk with your health care provider about whether you are at high risk of being infected with HIV. If you choose to  begin PrEP, you should first be tested for HIV. You should then be tested every 3 months for as long as you are taking PrEP.  Pregnancy  If you are premenopausal and you may become pregnant, ask your health care provider about preconception counseling.  If you may become pregnant, take 400 to 800 micrograms (mcg) of folic acid every day.  If you want to prevent pregnancy, talk to your health care provider about birth control (contraception).  Osteoporosis and menopause  Osteoporosis is a disease in which the bones lose minerals and strength with aging. This can result in serious bone fractures. Your risk for osteoporosis can be identified using a bone density scan.  If you are 65 years of age or older, or if you are at risk for osteoporosis and fractures, ask your health care provider if you should be screened.  Ask your health care provider whether you should take a calcium or vitamin D supplement to lower your risk for osteoporosis.  Menopause may have certain physical symptoms and risks.  Hormone replacement therapy may reduce some of these symptoms and risks.  Talk to your health care provider about whether hormone replacement therapy is right for you.  Follow these instructions at home:  Schedule regular health, dental, and eye exams.  Stay current with your immunizations.  Do not use any tobacco products including cigarettes, chewing tobacco, or electronic cigarettes.  If you are pregnant, do not drink alcohol.  If you are breastfeeding, limit how much and how often you drink alcohol.  Limit alcohol intake to no more than 1 drink per day for nonpregnant women. One drink equals 12 ounces of beer, 5 ounces of wine, or 1½ ounces of hard liquor.  Do not use street drugs.  Do not share needles.  Ask your health care provider for help if you need support or information about quitting drugs.  Tell your health care provider if you often feel depressed.  Tell your health care provider if you have ever been abused or do  not feel safe at home.  This information is not intended to replace advice given to you by your health care provider. Make sure you discuss any questions you have with your health care provider.  Document Released: 07/02/2012 Document Revised: 05/25/2017 Document Reviewed: 09/20/2016  [employer] Patient Education © 2018 [employer]

## 2022-10-20 ENCOUNTER — TELEPHONE (OUTPATIENT)
Dept: FAMILY MEDICINE CLINIC | Facility: CLINIC | Age: 57
End: 2022-10-20

## 2022-10-20 DIAGNOSIS — R30.0 DYSURIA: Primary | ICD-10-CM

## 2022-10-20 PROCEDURE — 81003 URINALYSIS AUTO W/O SCOPE: CPT | Performed by: FAMILY MEDICINE

## 2022-10-20 RX ORDER — SULFAMETHOXAZOLE AND TRIMETHOPRIM 800; 160 MG/1; MG/1
1 TABLET ORAL 2 TIMES DAILY
Qty: 6 TABLET | Refills: 0 | Status: SHIPPED | OUTPATIENT
Start: 2022-10-20 | End: 2022-12-08

## 2022-10-20 NOTE — TELEPHONE ENCOUNTER
I called her back she says she is having a little bit of back pain some burning stinging on urination now and will bring in a urine specimen tomorrow and I instructed her on how to do that properly.  We can run a urinalysis and culture on that please    I did call in some sulfa antibiotic for her she knows did collect the urine before she takes her antibiotics she does not have any allergies and drink plenty of fluids.  Not better or any worse she will return to the office   Caller: Tracey Shukla    Relationship: Self    Best call back number: [phone]    What is the best time to reach you: ANY TIME    Who are you requesting to speak with (clinical staff, provider,  specific staff member): NURSE    Do you know the name of the person who called: SELF    What was the call regarding: PATIENT IS NOW HAVING LOWER BACK DISCOMFORT AND DISCHARGE WITH URINE AND CRAMPING. PATIENT WAS INSTRUCTED TO CALL OFFICE IF SHE STARTED ANY SYMPTOMS. ON Tuesday URINE WAS CLOUDY AT APPOINTMENT. PLEASE ADVISE AND CALL PATIENT BACK    Do you require a callback: YES

## 2022-10-21 ENCOUNTER — CLINICAL SUPPORT (OUTPATIENT)
Dept: FAMILY MEDICINE CLINIC | Facility: CLINIC | Age: 57
End: 2022-10-21

## 2022-10-21 DIAGNOSIS — M54.50 LOW BACK PAIN, UNSPECIFIED BACK PAIN LATERALITY, UNSPECIFIED CHRONICITY, UNSPECIFIED WHETHER SCIATICA PRESENT: Primary | ICD-10-CM

## 2022-10-21 DIAGNOSIS — R30.0 DYSURIA: ICD-10-CM

## 2022-10-21 DIAGNOSIS — R82.90 ABNORMAL URINALYSIS: ICD-10-CM

## 2022-10-21 LAB
BILIRUB BLD-MCNC: NEGATIVE MG/DL
CLARITY, POC: CLEAR
COLOR UR: YELLOW
EXPIRATION DATE: ABNORMAL
GLUCOSE UR STRIP-MCNC: NEGATIVE MG/DL
KETONES UR QL: NEGATIVE
LEUKOCYTE EST, POC: ABNORMAL
Lab: ABNORMAL
NITRITE UR-MCNC: NEGATIVE MG/ML
PH UR: 6.5 [PH] (ref 5–8)
PROT UR STRIP-MCNC: NEGATIVE MG/DL
RBC # UR STRIP: ABNORMAL /UL
SP GR UR: 1.02 (ref 1–1.03)
UROBILINOGEN UR QL: NORMAL

## 2022-10-23 LAB
BACTERIA UR CULT: NORMAL
BACTERIA UR CULT: NORMAL

## 2022-10-24 RX ORDER — IBUPROFEN 800 MG/1
TABLET ORAL
Qty: 30 TABLET | Refills: 0 | Status: SHIPPED | OUTPATIENT
Start: 2022-10-24 | End: 2022-11-09

## 2022-10-24 NOTE — TELEPHONE ENCOUNTER
Rx Refill Note    Requested Prescriptions     Pending Prescriptions Disp Refills   • ibuprofen (ADVIL,MOTRIN) 800 MG tablet [Pharmacy Med Name: IBUPROFEN 800 MG TABLET] 90 tablet 0     Sig: TAKE ONE TABLET BY MOUTH THREE TIMES A DAY WITH FOOD        Last office visit with prescribing clinician: 10/18/2022      Next office visit with prescribing clinician: Visit date not found   Last labs:   Last refill: 06/08/2022   Pharmacy (be sure to add in Epic). correct

## 2022-10-25 ENCOUNTER — TELEPHONE (OUTPATIENT)
Dept: FAMILY MEDICINE CLINIC | Facility: CLINIC | Age: 57
End: 2022-10-25

## 2022-10-25 ENCOUNTER — CLINICAL SUPPORT (OUTPATIENT)
Dept: FAMILY MEDICINE CLINIC | Facility: CLINIC | Age: 57
End: 2022-10-25

## 2022-10-25 DIAGNOSIS — G43.901 MIGRAINE WITH STATUS MIGRAINOSUS, NOT INTRACTABLE, UNSPECIFIED MIGRAINE TYPE: Primary | ICD-10-CM

## 2022-10-25 PROCEDURE — 96372 THER/PROPH/DIAG INJ SC/IM: CPT | Performed by: FAMILY MEDICINE

## 2022-10-25 NOTE — TELEPHONE ENCOUNTER
----- Message from Alfredito Rocha MD sent at 10/24/2022  4:59 PM EDT -----  Your labs/tests were normal.  Please keep regular scheduled appointments and follow-up as directed.

## 2022-11-04 ENCOUNTER — LAB (OUTPATIENT)
Dept: FAMILY MEDICINE CLINIC | Facility: CLINIC | Age: 57
End: 2022-11-04

## 2022-11-04 DIAGNOSIS — E78.2 MIXED HYPERLIPIDEMIA: ICD-10-CM

## 2022-11-04 DIAGNOSIS — Z11.59 ENCOUNTER FOR HEPATITIS C SCREENING TEST FOR LOW RISK PATIENT: ICD-10-CM

## 2022-11-04 DIAGNOSIS — Z00.00 ANNUAL PHYSICAL EXAM: ICD-10-CM

## 2022-11-04 PROCEDURE — 36415 COLL VENOUS BLD VENIPUNCTURE: CPT | Performed by: FAMILY MEDICINE

## 2022-11-05 LAB
ALBUMIN SERPL-MCNC: 4.2 G/DL (ref 3.8–4.9)
ALBUMIN/GLOB SERPL: 2.3 {RATIO} (ref 1.2–2.2)
ALP SERPL-CCNC: 61 IU/L (ref 44–121)
ALT SERPL-CCNC: 11 IU/L (ref 0–32)
AST SERPL-CCNC: 16 IU/L (ref 0–40)
BASOPHILS # BLD AUTO: 0.1 X10E3/UL (ref 0–0.2)
BASOPHILS NFR BLD AUTO: 2 %
BILIRUB SERPL-MCNC: 0.5 MG/DL (ref 0–1.2)
BUN SERPL-MCNC: 20 MG/DL (ref 6–24)
BUN/CREAT SERPL: 24 (ref 9–23)
CALCIUM SERPL-MCNC: 8.9 MG/DL (ref 8.7–10.2)
CHLORIDE SERPL-SCNC: 110 MMOL/L (ref 96–106)
CHOLEST SERPL-MCNC: 198 MG/DL (ref 100–199)
CO2 SERPL-SCNC: 22 MMOL/L (ref 20–29)
CREAT SERPL-MCNC: 0.82 MG/DL (ref 0.57–1)
EGFRCR SERPLBLD CKD-EPI 2021: 84 ML/MIN/1.73
EOSINOPHIL # BLD AUTO: 0.2 X10E3/UL (ref 0–0.4)
EOSINOPHIL NFR BLD AUTO: 5 %
ERYTHROCYTE [DISTWIDTH] IN BLOOD BY AUTOMATED COUNT: 12.2 % (ref 11.7–15.4)
GLOBULIN SER CALC-MCNC: 1.8 G/DL (ref 1.5–4.5)
GLUCOSE SERPL-MCNC: 89 MG/DL (ref 70–99)
HCT VFR BLD AUTO: 41.9 % (ref 34–46.6)
HCV AB S/CO SERPL IA: 0.2 S/CO RATIO (ref 0–0.9)
HDLC SERPL-MCNC: 97 MG/DL
HGB BLD-MCNC: 13.7 G/DL (ref 11.1–15.9)
IMM GRANULOCYTES # BLD AUTO: 0 X10E3/UL (ref 0–0.1)
IMM GRANULOCYTES NFR BLD AUTO: 0 %
LDLC SERPL CALC-MCNC: 94 MG/DL (ref 0–99)
LYMPHOCYTES # BLD AUTO: 1.7 X10E3/UL (ref 0.7–3.1)
LYMPHOCYTES NFR BLD AUTO: 43 %
MCH RBC QN AUTO: 31.3 PG (ref 26.6–33)
MCHC RBC AUTO-ENTMCNC: 32.7 G/DL (ref 31.5–35.7)
MCV RBC AUTO: 96 FL (ref 79–97)
MONOCYTES # BLD AUTO: 0.4 X10E3/UL (ref 0.1–0.9)
MONOCYTES NFR BLD AUTO: 10 %
NEUTROPHILS # BLD AUTO: 1.6 X10E3/UL (ref 1.4–7)
NEUTROPHILS NFR BLD AUTO: 40 %
PLATELET # BLD AUTO: 236 X10E3/UL (ref 150–450)
POTASSIUM SERPL-SCNC: 4.9 MMOL/L (ref 3.5–5.2)
PROT SERPL-MCNC: 6 G/DL (ref 6–8.5)
RBC # BLD AUTO: 4.38 X10E6/UL (ref 3.77–5.28)
SODIUM SERPL-SCNC: 142 MMOL/L (ref 134–144)
TRIGL SERPL-MCNC: 32 MG/DL (ref 0–149)
VLDLC SERPL CALC-MCNC: 7 MG/DL (ref 5–40)
WBC # BLD AUTO: 3.9 X10E3/UL (ref 3.4–10.8)

## 2022-11-09 RX ORDER — IBUPROFEN 800 MG/1
TABLET ORAL
Qty: 30 TABLET | Refills: 0 | Status: SHIPPED | OUTPATIENT
Start: 2022-11-09 | End: 2022-12-05

## 2022-11-09 NOTE — TELEPHONE ENCOUNTER
Rx Refill Note    Requested Prescriptions     Pending Prescriptions Disp Refills   • ibuprofen (ADVIL,MOTRIN) 800 MG tablet [Pharmacy Med Name: IBUPROFEN 800 MG TABLET] 30 tablet 0     Sig: TAKE ONE TABLET BY MOUTH THREE TIMES A DAY WITH FOOD        Last office visit with prescribing clinician: 10/18/2022      Next office visit with prescribing clinician: Visit date not found   Last labs:   Last refill: 10/24/2022   Pharmacy (be sure to add in Epic). correct

## 2022-11-28 ENCOUNTER — CLINICAL SUPPORT (OUTPATIENT)
Dept: FAMILY MEDICINE CLINIC | Facility: CLINIC | Age: 57
End: 2022-11-28

## 2022-11-28 DIAGNOSIS — G43.901 MIGRAINE WITH STATUS MIGRAINOSUS, NOT INTRACTABLE, UNSPECIFIED MIGRAINE TYPE: Primary | ICD-10-CM

## 2022-11-28 PROCEDURE — 96372 THER/PROPH/DIAG INJ SC/IM: CPT | Performed by: FAMILY MEDICINE

## 2022-12-05 RX ORDER — IBUPROFEN 800 MG/1
TABLET ORAL
Qty: 30 TABLET | Refills: 0 | Status: SHIPPED | OUTPATIENT
Start: 2022-12-05 | End: 2023-02-09 | Stop reason: SDUPTHER

## 2022-12-05 NOTE — TELEPHONE ENCOUNTER
Rx Refill Note    Requested Prescriptions     Pending Prescriptions Disp Refills   • sertraline (ZOLOFT) 50 MG tablet 90 tablet 0     Sig: Take 1 tablet by mouth Daily.        Last office visit with prescribing clinician: 10/18/2022      Next office visit with prescribing clinician: Visit date not found   Last labs:   Last refill: 09/06/2022   Pharmacy (be sure to add in Epic). correct

## 2022-12-09 ENCOUNTER — OFFICE VISIT (OUTPATIENT)
Dept: OBSTETRICS AND GYNECOLOGY | Facility: CLINIC | Age: 57
End: 2022-12-09

## 2022-12-09 VITALS
WEIGHT: 182 LBS | SYSTOLIC BLOOD PRESSURE: 128 MMHG | DIASTOLIC BLOOD PRESSURE: 80 MMHG | BODY MASS INDEX: 25.75 KG/M2 | RESPIRATION RATE: 14 BRPM

## 2022-12-09 DIAGNOSIS — Z01.419 WELL WOMAN EXAM: Primary | ICD-10-CM

## 2022-12-09 DIAGNOSIS — Z71.85 VACCINE COUNSELING: ICD-10-CM

## 2022-12-09 PROCEDURE — 99396 PREV VISIT EST AGE 40-64: CPT | Performed by: OBSTETRICS & GYNECOLOGY

## 2022-12-09 RX ORDER — MEDROXYPROGESTERONE ACETATE 2.5 MG/1
2.5 TABLET ORAL DAILY
Qty: 90 TABLET | Refills: 4 | Status: SHIPPED | OUTPATIENT
Start: 2022-12-09 | End: 2023-03-07 | Stop reason: SDUPTHER

## 2022-12-09 RX ORDER — ESTRADIOL 1 MG/1
1 TABLET ORAL DAILY
Qty: 90 TABLET | Refills: 4 | Status: SHIPPED | OUTPATIENT
Start: 2022-12-09 | End: 2023-03-07 | Stop reason: SDUPTHER

## 2022-12-09 NOTE — PATIENT INSTRUCTIONS

## 2022-12-09 NOTE — PROGRESS NOTES
Subjective   Chief Complaint   Patient presents with   • Gynecologic Exam     Tracey Shukla is a 57 y.o. year old  menopausal female presenting to be seen for her annual exam.  When she was here last year we got her started on hormone replacement therapy.  Its made a significant difference in her symptoms.  She is very happy with the hormones and would like to continue them.  She has not had any vaginal bleeding in the last 12 months.  Menopausal symptoms are not present.    Her daughter is doing well.  She is a teacher in [place].  She is engaged and will be getting  in May 2023.    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  Does very vigorous work and is very happy    SEXUAL Hx:  She is currently sexually active.  In the past year there there has been NO new sexual partners.    Condoms are never used.  She would not like to be screened for STD's at today's exam.  [place] is painful: no  HEALTH Hx:  She exercises regularly: yes.  She wears her seat belt: yes.  She has concerns about domestic violence: no.  She has noticed changes in height: no.  OTHER THINGS SHE WANTS TO DISCUSS TODAY:  Nothing else    The following portions of the patient's history were reviewed and updated as appropriate:problem list, current medications, allergies, past family history, past medical history, past social history and past surgical history.    Social History    Tobacco Use      Smoking status: Never      Smokeless tobacco: Never      Review of Systems  Constitutional POS: nothing reported    NEG: anorexia or night sweats   Genitourinary POS: nothing reported    NEG: dysuria or hematuria      Gastointestinal POS: nothing reported    NEG: bloating, change in bowel habits, melena or reflux symptoms   Integument POS: nothing reported    NEG: moles that are changing in size, shape, color or rashes   Breast POS: nothing reported    NEG: persistent breast lump, skin dimpling or nipple  discharge        Objective   /80   Resp 14   Wt 82.6 kg (182 lb)   BMI 25.75 kg/m²     General:  well developed; well nourished  no acute distress   Skin:  No suspicious lesions seen   Thyroid: normal to inspection and palpation   Breasts:  Examined in supine position  Symmetric without masses or skin dimpling  Nipples normal without inversion, lesions or discharge  There are no palpable axillary nodes   Abdomen: soft, non-tender; no masses  no umbilical or inguinal hernias are present  no hepato-splenomegaly   Pelvis: Clinical staff was present for exam  External genitalia:  normal appearance of the external genitalia including Bartholin's and Pleak's glands.  :  urethral meatus normal;  Vaginal:  normal pink mucosa without prolapse or lesions.  Cervix:  normal appearance.  Uterus:  normal size, shape and consistency.  Adnexa:  normal bimanual exam of the adnexa.  Rectal:  digital rectal exam not performed; anus visually normal appearing.        Assessment   1. Normal GYN exam in menopause  2. Menopausal female currently on HRT - without significant symptoms affecting activities of daily living  3. She is up to date on all relevant gynecologic and colorectal screenings     Plan   1. Pap was done today.  If she does not receive the results of the Pap within 2 weeks  time, she was instructed to call to find out the results.  I explained to Tracey that the recommendations for Pap smear interval in a low risk patient's has lengthened to 3 years time.  I encouraged her to be seen yearly for a full physical exam including breast and pelvic exam even during the off years when PAP's will not be performed.  2. She was encouraged to get yearly mammograms.  She should report any palpable breast lump(s) or skin changes regardless of mammographic findings.  I explained to Tracey that notification regarding her mammogram results will come from the center performing the study.  Our office will not be routinely calling with  mammogram results.  It is her responsibility to make sure that the results from the mammogram are communicated to her by the breast center.  If she has any questions about the results, she is welcome to call our office anytime.  3. Her vaccine record was reviewed and updated.  4. I discussed with Tracey that she may be behind on needed vaccinations for TDAP.  She may be able to obtain these vaccinations at her local pharmacy OR speak about obtaining them with her primary care.  If she does obtain her vaccines, I have asked Tracey to let us know the date each vaccine was obtained so that her medical record could be updated in our system.  5. The importance of keeping all planned follow-up and taking all medications as prescribed was emphasized.  6. Follow up for annual exam 1 year    New Medications Ordered This Visit   Medications   • medroxyPROGESTERone (Provera) 2.5 MG tablet     Sig: Take 1 tablet by mouth Daily.     Dispense:  90 tablet     Refill:  4   • estradiol (Estrace) 1 MG tablet     Sig: Take 1 tablet by mouth Daily.     Dispense:  90 tablet     Refill:  4          This note was electronically signed.    Jose Maria Hines M.D.  December 9, 2022    Part of this note may be an electronic transcription/translation of spoken language to printed text using the Dragon Dictation System.

## 2022-12-12 LAB — REF LAB TEST METHOD: NORMAL

## 2022-12-15 RX ORDER — TOPIRAMATE 50 MG/1
TABLET, FILM COATED ORAL
OUTPATIENT
Start: 2022-12-15

## 2022-12-15 NOTE — TELEPHONE ENCOUNTER
Rx Refill Note    Requested Prescriptions     Pending Prescriptions Disp Refills   • topiramate (TOPAMAX) 50 MG tablet          Last office visit with prescribing clinician: 10/18/2022      Next office visit with prescribing clinician: Visit date not found   Last labs:   Last refill: I did not see where this has been sent via [facility]   Pharmacy (be sure to add in Epic). correct

## 2022-12-27 ENCOUNTER — CLINICAL SUPPORT (OUTPATIENT)
Dept: FAMILY MEDICINE CLINIC | Facility: CLINIC | Age: 57
End: 2022-12-27

## 2022-12-27 DIAGNOSIS — G43.901 MIGRAINE WITH STATUS MIGRAINOSUS, NOT INTRACTABLE, UNSPECIFIED MIGRAINE TYPE: Primary | ICD-10-CM

## 2022-12-27 PROCEDURE — 96372 THER/PROPH/DIAG INJ SC/IM: CPT | Performed by: FAMILY MEDICINE

## 2023-01-11 DIAGNOSIS — F41.9 ANXIETY: ICD-10-CM

## 2023-01-12 RX ORDER — ALPRAZOLAM 0.5 MG/1
0.5 TABLET ORAL 3 TIMES DAILY PRN
Qty: 60 TABLET | Refills: 0 | OUTPATIENT
Start: 2023-01-12

## 2023-01-12 NOTE — TELEPHONE ENCOUNTER
Needs appointment for this refill  She could come in today this afternoon will be fine  Please call her

## 2023-01-12 NOTE — TELEPHONE ENCOUNTER
Rx Refill Note    Requested Prescriptions     Pending Prescriptions Disp Refills   • ALPRAZolam (XANAX) 0.5 MG tablet 60 tablet 0     Sig: Take 1 tablet by mouth 3 (Three) Times a Day As Needed for Anxiety.        Last office visit with prescribing clinician: 10/18/2022      Next office visit with prescribing clinician: Visit date not found   Last labs:   Last refill: 10/07/2022   Pharmacy (be sure to add in Epic). correct

## 2023-01-13 ENCOUNTER — OFFICE VISIT (OUTPATIENT)
Dept: FAMILY MEDICINE CLINIC | Facility: CLINIC | Age: 58
End: 2023-01-13
Payer: COMMERCIAL

## 2023-01-13 VITALS
HEIGHT: 71 IN | HEART RATE: 77 BPM | WEIGHT: 180 LBS | DIASTOLIC BLOOD PRESSURE: 78 MMHG | BODY MASS INDEX: 25.2 KG/M2 | OXYGEN SATURATION: 98 % | TEMPERATURE: 97.1 F | SYSTOLIC BLOOD PRESSURE: 112 MMHG | RESPIRATION RATE: 15 BRPM

## 2023-01-13 DIAGNOSIS — F51.01 PRIMARY INSOMNIA: ICD-10-CM

## 2023-01-13 DIAGNOSIS — Z79.899 HIGH RISK MEDICATION USE: ICD-10-CM

## 2023-01-13 DIAGNOSIS — F41.9 ANXIETY: Primary | ICD-10-CM

## 2023-01-13 DIAGNOSIS — E78.5 HYPERLIPIDEMIA, UNSPECIFIED HYPERLIPIDEMIA TYPE: ICD-10-CM

## 2023-01-13 PROCEDURE — 80305 DRUG TEST PRSMV DIR OPT OBS: CPT | Performed by: FAMILY MEDICINE

## 2023-01-13 PROCEDURE — 99214 OFFICE O/P EST MOD 30 MIN: CPT | Performed by: FAMILY MEDICINE

## 2023-01-13 RX ORDER — ALPRAZOLAM 0.5 MG/1
0.5 TABLET ORAL 3 TIMES DAILY PRN
Qty: 60 TABLET | Refills: 0 | Status: SHIPPED | OUTPATIENT
Start: 2023-01-13

## 2023-01-13 RX ORDER — ZOLPIDEM TARTRATE 10 MG/1
10 TABLET ORAL NIGHTLY PRN
Qty: 30 TABLET | Refills: 2 | Status: SHIPPED | OUTPATIENT
Start: 2023-01-13

## 2023-01-13 NOTE — PROGRESS NOTES
Follow Up Office Visit      Patient Name: Tracey Shukla  : 1965   MRN: [id number]     Chief Complaint:    Chief Complaint   Patient presents with   • Anxiety   • Insomnia       History of Present Illness: Tracey Shukla is a 57 y.o. female who is here today to   follow-up on her anxiety and says she is going to be flying out to [place] with her sorority sisters and then does have a little bit of stress with her daughter getting  in May and her daughter lives in [place] but the wedding can be here in [place] etc. etc. so she just uses Xanax as needed and then she also Ambien as well as needed she has a refill left I want to go ahead and get that on the docket.    She does do exercise and still works [employer] regularly.    Review of Systems   Constitutional: Negative for fatigue and fever.   Respiratory: Negative for cough and shortness of breath.    Cardiovascular: Negative for chest pain and palpitations.   Skin: Negative for rash or itching      Subjective      Review of Systems:   Review of Systems    Past Medical History:   Past Medical History:   Diagnosis Date   • Anxiety - PCP manages    • Diverticulosis     Seen incidentally at colonoscopy   • Frozen shoulder - right 2017   • Goiter    • H/O migraine    • Insomnia        Past Surgical History:   Past Surgical History:   Procedure Laterality Date   • CLOSED MANIPULATION SHOULDER Right 2017   • COLONOSCOPY  2015    10 YEARS   • INCISIONAL HERNIA REPAIR      [place] [facility] [place]   • LAPAROSCOPIC SALPINGOOPHERECTOMY Left     Enlarged ovary - benign (Dr. Mir López)   • THYROID SURGERY      Goiter removed       Family History:   Family History   Problem Relation Age of Onset   • Lung cancer Mother    • Depression Father        Social History:   Social History     Socioeconomic History   • Marital status:    Tobacco Use   • Smoking status: Never   • Smokeless tobacco: Never   Vaping Use   • Vaping Use:  Never used   Substance and Sexual Activity   • Alcohol use: Yes     Alcohol/week: 1.0 standard drink     Types: 1 Glasses of wine per week     Comment: daily   • Drug use: Never       Medications:     Current Outpatient Medications:   •  ALPRAZolam (XANAX) 0.5 MG tablet, Take 1 tablet by mouth 3 (Three) Times a Day As Needed for Anxiety., Disp: 60 tablet, Rfl: 0  •  zolpidem (AMBIEN) 10 MG tablet, Take 1 tablet by mouth At Night As Needed for Sleep., Disp: 30 tablet, Rfl: 2  •  cyclobenzaprine (FLEXERIL) 10 MG tablet, Take 1 tablet by mouth At Night As Needed for Muscle Spasms., Disp: 30 tablet, Rfl: 0  •  estradiol (Estrace) 1 MG tablet, Take 1 tablet by mouth Daily., Disp: 90 tablet, Rfl: 4  •  fluticasone (FLONASE) 50 MCG/ACT nasal spray, fluticasone propionate 50 mcg/actuation nasal spray,suspension, Disp: , Rfl:   •  Fremanezumab-vfrm (Ajovy) 225 MG/1.5ML solution auto-injector, Inject 225 mg under the skin into the appropriate area as directed Every 30 (Thirty) Days., Disp: , Rfl:   •  ibuprofen (ADVIL,MOTRIN) 800 MG tablet, TAKE ONE TABLET BY MOUTH THREE TIMES A DAY WITH FOOD, Disp: 30 tablet, Rfl: 0  •  medroxyPROGESTERone (Provera) 2.5 MG tablet, Take 1 tablet by mouth Daily., Disp: 90 tablet, Rfl: 4  •  rizatriptan (MAXALT) 10 MG tablet, rizatriptan 10 mg tablet, Disp: , Rfl:   •  sertraline (ZOLOFT) 50 MG tablet, Take 1 tablet by mouth Daily., Disp: 90 tablet, Rfl: 0  •  topiramate (TOPAMAX) 50 MG tablet, topiramate 50 mg tablet, Disp: , Rfl:     Current Facility-Administered Medications:   •  Fremanezumab-vfrm solution prefilled syringe 225 mg, 225 mg, Subcutaneous, Q30 Days, Alfredito Rocha MD, 225 mg at 12/27/22 0944    Allergies:   No Known Allergies    Objective     Physical Exam:  Vital Signs:   Vitals:    01/13/23 1031   BP: 112/78   BP Location: Left arm   Patient Position: Sitting   Cuff Size: Adult   Pulse: 77   Resp: 15   Temp: 97.1 °F (36.2 °C)   TempSrc: Infrared   SpO2: 98%   Weight: 81.6 kg  "(180 lb)   Height: 179.1 cm (70.5\")   PainSc: 0-No pain     Body mass index is 25.46 kg/m².     Physical Exam  Vitals and nursing note reviewed.   Constitutional:       Appearance: Normal appearance.   HENT:      Head: Normocephalic and atraumatic.   Cardiovascular:      Rate and Rhythm: Normal rate and regular rhythm.   Pulmonary:      Effort: Pulmonary effort is normal.      Breath sounds: Normal breath sounds.   Musculoskeletal:         General: Normal range of motion.      Cervical back: Normal range of motion and neck supple.      Right lower leg: No edema.      Left lower leg: No edema.   Skin:     General: Skin is warm and dry.   Neurological:      General: No focal deficit present.      Mental Status: She is alert.   Psychiatric:         Mood and Affect: Mood normal.         Behavior: Behavior normal.         Procedures    PHQ-9 Total Score:       Assessment / Plan      Assessment/Plan:   Diagnoses and all orders for this visit:    1. Anxiety (Primary)  -     ALPRAZolam (XANAX) 0.5 MG tablet; Take 1 tablet by mouth 3 (Three) Times a Day As Needed for Anxiety.  Dispense: 60 tablet; Refill: 0    2. High risk medication use  -     POC Urine Drug Screen, Triage    3. Primary insomnia  -     zolpidem (AMBIEN) 10 MG tablet; Take 1 tablet by mouth At Night As Needed for Sleep.  Dispense: 30 tablet; Refill: 2         Continue Xanax for as needed use as directed and she knows to follow-up within 3 months but she says that she may be able to spread this out further as she does not take the medicine all the time.  And refilled her Ambien as well she is stable on it as well    She signed the consent UDS is negative and this is appropriate for her given her as needed status on those medicines.  Irving is appropriate.    She also wondered about weight loss medications and I told her her BMI is good just a little above 25 and just continue with good diet and exercise she does not need medications.  BMI is >= 25 and <30. " (Overweight) The following options were offered after discussion;: exercise counseling/recommendations and nutrition counseling/recommendations  Follow-up labs in 6-9 months or so for her physical    Follow Up:   Return in about 3 months (around 4/13/2023) for Recheck.        Alfredito Rocha MD  [facility]   Portions of note created with Dragon voice recognition technology  Answers for HPI/ROS submitted by the patient on 1/12/2023  Please describe your symptoms.: RX refill.  Have you had these symptoms before?: Yes  How long have you been having these symptoms?: Greater than 2 weeks  Please list any medications you are currently taking for this condition.: Xanax  Please describe any probable cause for these symptoms. : Upcoming airline flight and ongoing difficulties with father.  What is the primary reason for your visit?: Other

## 2023-02-09 RX ORDER — IBUPROFEN 800 MG/1
800 TABLET ORAL EVERY 8 HOURS PRN
Qty: 30 TABLET | Refills: 0 | Status: SHIPPED | OUTPATIENT
Start: 2023-02-09 | End: 2023-03-11 | Stop reason: SDUPTHER

## 2023-02-09 NOTE — TELEPHONE ENCOUNTER
Rx Refill Note    Requested Prescriptions     Pending Prescriptions Disp Refills   • ibuprofen (ADVIL,MOTRIN) 800 MG tablet 30 tablet 0     Sig: Take 1 tablet by mouth 3 (Three) Times a Day With Meals.        Last office visit with prescribing clinician: 1/13/2023      Next office visit with prescribing clinician: Visit date not found   Last labs:   Last refill: 12/05/2022   Pharmacy (be sure to add in Epic). correct

## 2023-03-06 NOTE — TELEPHONE ENCOUNTER
Rx Refill Note    Requested Prescriptions     Pending Prescriptions Disp Refills   • sertraline (ZOLOFT) 50 MG tablet [Pharmacy Med Name: SERTRALINE HCL 50 MG TABLET] 90 tablet 0     Sig: TAKE ONE TABLET BY MOUTH DAILY        Last office visit with prescribing clinician: 1/13/2023      Next office visit with prescribing clinician: Visit date not found   Last labs:   Last refill: 12/05/2022   Pharmacy (be sure to add in Epic). correct

## 2023-03-07 RX ORDER — ESTRADIOL 1 MG/1
1 TABLET ORAL DAILY
Qty: 90 TABLET | Refills: 4 | Status: SHIPPED | OUTPATIENT
Start: 2023-03-07

## 2023-03-07 RX ORDER — MEDROXYPROGESTERONE ACETATE 2.5 MG/1
2.5 TABLET ORAL DAILY
Qty: 90 TABLET | Refills: 4 | Status: SHIPPED | OUTPATIENT
Start: 2023-03-07

## 2023-03-13 ENCOUNTER — TELEPHONE (OUTPATIENT)
Dept: FAMILY MEDICINE CLINIC | Facility: CLINIC | Age: 58
End: 2023-03-13

## 2023-03-13 RX ORDER — IBUPROFEN 800 MG/1
800 TABLET ORAL EVERY 8 HOURS PRN
Qty: 30 TABLET | Refills: 0 | Status: SHIPPED | OUTPATIENT
Start: 2023-03-13

## 2023-03-13 NOTE — TELEPHONE ENCOUNTER
Rx Refill Note    Requested Prescriptions     Pending Prescriptions Disp Refills   • ibuprofen (ADVIL,MOTRIN) 800 MG tablet 30 tablet 0     Sig: Take 1 tablet by mouth Every 8 (Eight) Hours As Needed for Mild Pain.        Last office visit with prescribing clinician: 1/13/2023      Next office visit with prescribing clinician: Visit date not found   Last labs:   Last refill: 02/09/2023   Pharmacy (be sure to add in Epic). correct

## 2023-03-13 NOTE — TELEPHONE ENCOUNTER
PT. IS NEEDING MORE PAIN MED FOR HER WORKERS COMP INJURY. PT. WAS MADE AWARE THERE IS NO GUARANTEE SHE WOULD GET THE PAIN MEDICATION SHE WANTS. PT. IS UNSURE ON WHAT TO DO AND WAS TOLD IF PAIN IS UNBEARABLE GO TO ER. SHE REQUEST CARLOS OR OVIDIO CALL HERE    CONTACT NUMBER: [phone]

## 2023-03-14 ENCOUNTER — TELEPHONE (OUTPATIENT)
Dept: FAMILY MEDICINE CLINIC | Facility: CLINIC | Age: 58
End: 2023-03-14

## 2023-03-14 NOTE — TELEPHONE ENCOUNTER
Patient called back today to check on request of yesterday's telephone msg to Holly BROOKS About her workers comp medication request for her pain.     [phone]

## 2023-03-20 ENCOUNTER — TELEPHONE (OUTPATIENT)
Dept: FAMILY MEDICINE CLINIC | Facility: CLINIC | Age: 58
End: 2023-03-20
Payer: COMMERCIAL

## 2023-03-20 DIAGNOSIS — S22.42XD CLOSED FRACTURE OF MULTIPLE RIBS OF LEFT SIDE WITH ROUTINE HEALING, SUBSEQUENT ENCOUNTER: Primary | ICD-10-CM

## 2023-03-20 RX ORDER — OMEPRAZOLE 20 MG/1
20 CAPSULE, DELAYED RELEASE ORAL DAILY
Qty: 30 CAPSULE | Refills: 1 | Status: SHIPPED | OUTPATIENT
Start: 2023-03-20

## 2023-03-20 RX ORDER — OXYCODONE HYDROCHLORIDE 5 MG/1
5 TABLET ORAL EVERY 6 HOURS PRN
Qty: 16 TABLET | Refills: 0 | Status: SHIPPED | OUTPATIENT
Start: 2023-03-20

## 2023-03-20 NOTE — TELEPHONE ENCOUNTER
She had a work comp injury visit with Holly this morning as she had a horse stepped on her and broke 3 ribs and she is having quite a bit of pain and burning pain there she was given oxycodone in the ER on 8 March the 5 mg tablets and this did well for she knows not to use it with the benzodiazepines or her Ambien or her Xanax as this could cause respiratory depression.  She says she has been on ibuprofen 800 mg every 8 hours and does not want to abuse this either.  I asked her to take Prilosec 20 mg once per day to help prevent GI bleed with this medication she agreed and I did agree to call in the oxycodone and she will follow-up with Holly in a couple weeks certainly sooner if she has any problems    Medication risk benefits side effects discussed with her.  She is agreeable to our plan

## 2023-03-27 ENCOUNTER — TELEPHONE (OUTPATIENT)
Dept: FAMILY MEDICINE CLINIC | Facility: CLINIC | Age: 58
End: 2023-03-27
Payer: COMMERCIAL

## 2023-03-27 NOTE — TELEPHONE ENCOUNTER
Patient is requesting office notes from appt on March 20TH with amaury Barrera called previously but system was down. Wants them mailed.      [phone]

## 2023-04-10 RX ORDER — IBUPROFEN 800 MG/1
800 TABLET ORAL EVERY 8 HOURS PRN
Qty: 30 TABLET | Refills: 0 | Status: SHIPPED | OUTPATIENT
Start: 2023-04-10

## 2023-04-21 ENCOUNTER — OFFICE VISIT (OUTPATIENT)
Dept: FAMILY MEDICINE CLINIC | Facility: CLINIC | Age: 58
End: 2023-04-21
Payer: COMMERCIAL

## 2023-04-21 VITALS
TEMPERATURE: 97.1 F | SYSTOLIC BLOOD PRESSURE: 114 MMHG | WEIGHT: 184 LBS | HEIGHT: 71 IN | RESPIRATION RATE: 15 BRPM | DIASTOLIC BLOOD PRESSURE: 78 MMHG | HEART RATE: 59 BPM | BODY MASS INDEX: 25.76 KG/M2 | OXYGEN SATURATION: 98 %

## 2023-04-21 DIAGNOSIS — F51.01 PRIMARY INSOMNIA: ICD-10-CM

## 2023-04-21 DIAGNOSIS — S22.42XD CLOSED FRACTURE OF MULTIPLE RIBS OF LEFT SIDE WITH ROUTINE HEALING, SUBSEQUENT ENCOUNTER: ICD-10-CM

## 2023-04-21 DIAGNOSIS — F41.9 ANXIETY: ICD-10-CM

## 2023-04-21 RX ORDER — OXYCODONE HYDROCHLORIDE 5 MG/1
5 TABLET ORAL EVERY 6 HOURS PRN
Qty: 10 TABLET | Refills: 0 | Status: SHIPPED | OUTPATIENT
Start: 2023-04-21

## 2023-04-21 RX ORDER — ZOLPIDEM TARTRATE 10 MG/1
10 TABLET ORAL NIGHTLY PRN
Qty: 30 TABLET | Refills: 2 | Status: SHIPPED | OUTPATIENT
Start: 2023-04-21

## 2023-04-21 RX ORDER — ALPRAZOLAM 0.5 MG/1
0.5 TABLET ORAL 3 TIMES DAILY PRN
Qty: 60 TABLET | Refills: 0 | Status: SHIPPED | OUTPATIENT
Start: 2023-04-21

## 2023-04-21 NOTE — PROGRESS NOTES
Follow Up Office Visit      Patient Name: Tracey Shukla  : 1965   MRN: [id number]     Chief Complaint:    Chief Complaint   Patient presents with   • Follow-up       History of Present Illness: Tracey Shukla is a 58 y.o. female who is here today to   follow-up on her anxiety insomnia and most recently the 3 left-sided rib fractures she had after the horse stomped on her and still has a little pain and wondered about possibly getting some more of her pain pills just to have on hand.  She did go back to work this past Wednesday and was pretty sore after work.  She knows not to combine those with her benzodiazepines or her sleeping pills.    But her chest is better all in all.  Not able to take a deep breath now without hurting too bad.    Review of Systems   Constitutional: Negative for fatigue and fever.   Respiratory: Negative for cough and shortness of breath.    Cardiovascular: Negative for chest pain and palpitations.   Skin: Negative for rash or itching      Subjective      Review of Systems:   Review of Systems    Past Medical History:   Past Medical History:   Diagnosis Date   • Anxiety - PCP manages    • Diverticulosis     Seen incidentally at colonoscopy   • Frozen shoulder - right 2017   • Goiter    • H/O migraine    • Insomnia        Past Surgical History:   Past Surgical History:   Procedure Laterality Date   • CLOSED MANIPULATION SHOULDER Right 2017   • COLONOSCOPY      10 YEARS   • INCISIONAL HERNIA REPAIR      [place] [facility] [place]   • LAPAROSCOPIC SALPINGOOPHERECTOMY Left     Enlarged ovary - benign (Dr. Mir López)   • THYROID SURGERY      Goiter removed       Family History:   Family History   Problem Relation Age of Onset   • Lung cancer Mother    • Depression Father        Social History:   Social History     Socioeconomic History   • Marital status:    Tobacco Use   • Smoking status: Never   • Smokeless tobacco: Never   Vaping Use   • Vaping  Use: Never used   Substance and Sexual Activity   • Alcohol use: Yes     Alcohol/week: 1.0 standard drink     Types: 1 Glasses of wine per week     Comment: daily   • Drug use: Never       Medications:     Current Outpatient Medications:   •  ALPRAZolam (XANAX) 0.5 MG tablet, Take 1 tablet by mouth 3 (Three) Times a Day As Needed for Anxiety., Disp: 60 tablet, Rfl: 0  •  oxyCODONE (Roxicodone) 5 MG immediate release tablet, Take 1 tablet by mouth Every 6 (Six) Hours As Needed for Moderate Pain. Do not  Take w  Xanax/ ambien, Disp: 10 tablet, Rfl: 0  •  zolpidem (AMBIEN) 10 MG tablet, Take 1 tablet by mouth At Night As Needed for Sleep., Disp: 30 tablet, Rfl: 2  •  cyclobenzaprine (FLEXERIL) 10 MG tablet, Take 1 tablet by mouth At Night As Needed for Muscle Spasms., Disp: 30 tablet, Rfl: 0  •  estradiol (Estrace) 1 MG tablet, Take 1 tablet by mouth Daily., Disp: 90 tablet, Rfl: 4  •  Fremanezumab-vfrm (Ajovy) 225 MG/1.5ML solution auto-injector, Inject 225 mg under the skin into the appropriate area as directed Every 30 (Thirty) Days., Disp: , Rfl:   •  ibuprofen (ADVIL,MOTRIN) 800 MG tablet, Take 1 tablet by mouth Every 8 (Eight) Hours As Needed for Mild Pain., Disp: 30 tablet, Rfl: 0  •  medroxyPROGESTERone (Provera) 2.5 MG tablet, Take 1 tablet by mouth Daily., Disp: 90 tablet, Rfl: 4  •  omeprazole (priLOSEC) 20 MG capsule, Take 1 capsule by mouth Daily., Disp: 30 capsule, Rfl: 1  •  rizatriptan (MAXALT) 10 MG tablet, rizatriptan 10 mg tablet, Disp: , Rfl:   •  sertraline (ZOLOFT) 50 MG tablet, TAKE ONE TABLET BY MOUTH DAILY, Disp: 90 tablet, Rfl: 0  •  topiramate (TOPAMAX) 50 MG tablet, topiramate 50 mg tablet, Disp: , Rfl:     Current Facility-Administered Medications:   •  Fremanezumab-vfrm solution prefilled syringe 225 mg, 225 mg, Subcutaneous, Q30 Days, Alfredito Rocha MD, 225 mg at 12/27/22 0944    Allergies:   No Known Allergies    Objective     Physical Exam:  Vital Signs:   Vitals:    04/21/23 1452   BP:  "114/78   BP Location: Right arm   Patient Position: Sitting   Cuff Size: Adult   Pulse: 59   Resp: 15   Temp: 97.1 °F (36.2 °C)   TempSrc: Infrared   SpO2: 98%   Weight: 83.5 kg (184 lb)   Height: 179.1 cm (70.5\")   PainSc: 0-No pain     Body mass index is 26.03 kg/m².     Physical Exam  Vitals and nursing note reviewed.   Constitutional:       Appearance: Normal appearance.   HENT:      Head: Normocephalic and atraumatic.   Cardiovascular:      Rate and Rhythm: Normal rate and regular rhythm.      Comments: Upper chest wall little tender to palpation but she says this is much better than previous  Pulmonary:      Effort: Pulmonary effort is normal.      Breath sounds: Normal breath sounds.   Musculoskeletal:         General: No tenderness. Normal range of motion.      Cervical back: Normal range of motion and neck supple.      Right lower leg: No edema.      Left lower leg: No edema.   Skin:     General: Skin is warm and dry.      Comments: Laceration scar on chin is healing well ---about 1 cm in length   Neurological:      General: No focal deficit present.      Mental Status: She is alert.   Psychiatric:         Mood and Affect: Mood normal.         Behavior: Behavior normal.         Procedures    PHQ-9 Total Score:       Assessment / Plan      Assessment/Plan:   Diagnoses and all orders for this visit:    1. Primary insomnia  -     zolpidem (AMBIEN) 10 MG tablet; Take 1 tablet by mouth At Night As Needed for Sleep.  Dispense: 30 tablet; Refill: 2    2. Anxiety  -     ALPRAZolam (XANAX) 0.5 MG tablet; Take 1 tablet by mouth 3 (Three) Times a Day As Needed for Anxiety.  Dispense: 60 tablet; Refill: 0    3. Closed fracture of multiple ribs of left side with routine healing, subsequent encounter  -     oxyCODONE (Roxicodone) 5 MG immediate release tablet; Take 1 tablet by mouth Every 6 (Six) Hours As Needed for Moderate Pain. Do not  Take w  Xanax/ ambien  Dispense: 10 tablet; Refill: 0         Continue Ambien as " directed she generally just takes a half a tablet    Xanax just on a as needed basis    For her rib pain I did give her a few more oxycodone to have on hand for the severe pain she knows not to take those with benzodiazepines or Ambien.    She signed the consent Irving is appropriate follow-up in 6 months sooner if worse or any problems..    Old labs reviewed  BMI is >= 25 and <30. (Overweight) The following options were offered after discussion;: exercise counseling/recommendations and nutrition counseling/recommendations      Follow Up:   Return in about 6 months (around 10/21/2023) for Labs prior next visit, Annual physical.        Alfredito Rocha MD  [facility]   Portions of note created with Dragon voice recognition technology

## 2023-05-08 RX ORDER — IBUPROFEN 800 MG/1
800 TABLET ORAL EVERY 8 HOURS PRN
Qty: 30 TABLET | Refills: 0 | Status: SHIPPED | OUTPATIENT
Start: 2023-05-08

## 2023-05-08 NOTE — TELEPHONE ENCOUNTER
Rx Refill Note    Requested Prescriptions     Pending Prescriptions Disp Refills   • ibuprofen (ADVIL,MOTRIN) 800 MG tablet 30 tablet 0     Sig: Take 1 tablet by mouth Every 8 (Eight) Hours As Needed for Mild Pain.        Last office visit with prescribing clinician: 4/21/2023      Next office visit with prescribing clinician: Visit date not found   Last labs:   Last refill: 04/10/2023   Pharmacy (be sure to add in Epic). correct

## 2023-05-15 DIAGNOSIS — S22.42XD CLOSED FRACTURE OF MULTIPLE RIBS OF LEFT SIDE WITH ROUTINE HEALING, SUBSEQUENT ENCOUNTER: ICD-10-CM

## 2023-05-15 RX ORDER — OMEPRAZOLE 20 MG/1
CAPSULE, DELAYED RELEASE ORAL
Qty: 30 CAPSULE | Refills: 1 | Status: SHIPPED | OUTPATIENT
Start: 2023-05-15

## 2023-05-15 NOTE — TELEPHONE ENCOUNTER
Rx Refill Note    Requested Prescriptions     Pending Prescriptions Disp Refills   • omeprazole (priLOSEC) 20 MG capsule [Pharmacy Med Name: OMEPRAZOLE DR 20 MG CAPSULE] 30 capsule 1     Sig: TAKE ONE CAPSULE BY MOUTH DAILY        Last office visit with prescribing clinician: 4/21/2023      Next office visit with prescribing clinician: Visit date not found   Last labs:   Last refill: 03/20/2023   Pharmacy (be sure to add in Epic). correct

## 2023-06-08 RX ORDER — IBUPROFEN 800 MG/1
800 TABLET ORAL EVERY 8 HOURS PRN
Qty: 90 TABLET | Refills: 0 | Status: SHIPPED | OUTPATIENT
Start: 2023-06-08

## 2023-06-08 NOTE — TELEPHONE ENCOUNTER
Rx Refill Note    Requested Prescriptions     Pending Prescriptions Disp Refills    ibuprofen (ADVIL,MOTRIN) 800 MG tablet 30 tablet 0     Sig: Take 1 tablet by mouth Every 8 (Eight) Hours As Needed for Mild Pain.        Last office visit with prescribing clinician: 4/21/2023      Next office visit with prescribing clinician: Visit date not found   Last labs:   Last refill: 05/08/2023   Pharmacy (be sure to add in Epic). correct

## 2023-06-09 ENCOUNTER — OFFICE VISIT (OUTPATIENT)
Dept: FAMILY MEDICINE CLINIC | Facility: CLINIC | Age: 58
End: 2023-06-09
Payer: COMMERCIAL

## 2023-06-09 VITALS
DIASTOLIC BLOOD PRESSURE: 80 MMHG | HEART RATE: 75 BPM | OXYGEN SATURATION: 97 % | HEIGHT: 70 IN | SYSTOLIC BLOOD PRESSURE: 118 MMHG | WEIGHT: 183.5 LBS | BODY MASS INDEX: 26.27 KG/M2 | TEMPERATURE: 98.5 F | RESPIRATION RATE: 15 BRPM

## 2023-06-09 DIAGNOSIS — J06.9 URI WITH COUGH AND CONGESTION: Primary | ICD-10-CM

## 2023-06-09 LAB
EXPIRATION DATE: NORMAL
EXPIRATION DATE: NORMAL
FLUAV AG UPPER RESP QL IA.RAPID: NOT DETECTED
FLUBV AG UPPER RESP QL IA.RAPID: NOT DETECTED
INTERNAL CONTROL: NORMAL
INTERNAL CONTROL: NORMAL
Lab: NORMAL
Lab: NORMAL
S PYO AG THROAT QL: NEGATIVE
SARS-COV-2 AG UPPER RESP QL IA.RAPID: NOT DETECTED

## 2023-06-09 PROCEDURE — 99213 OFFICE O/P EST LOW 20 MIN: CPT | Performed by: PHYSICIAN ASSISTANT

## 2023-06-09 PROCEDURE — 87880 STREP A ASSAY W/OPTIC: CPT | Performed by: PHYSICIAN ASSISTANT

## 2023-06-09 PROCEDURE — 87428 SARSCOV & INF VIR A&B AG IA: CPT | Performed by: PHYSICIAN ASSISTANT

## 2023-06-09 RX ORDER — AZITHROMYCIN 250 MG/1
TABLET, FILM COATED ORAL
Qty: 6 TABLET | Refills: 0 | Status: SHIPPED | OUTPATIENT
Start: 2023-06-09

## 2023-06-09 RX ORDER — DEXTROMETHORPHAN HYDROBROMIDE AND PROMETHAZINE HYDROCHLORIDE 15; 6.25 MG/5ML; MG/5ML
5 SYRUP ORAL 4 TIMES DAILY PRN
Qty: 120 ML | Refills: 1 | Status: SHIPPED | OUTPATIENT
Start: 2023-06-09

## 2023-06-09 RX ORDER — ERENUMAB-AOOE 70 MG/ML
INJECTION SUBCUTANEOUS
COMMUNITY
Start: 2023-05-26

## 2023-06-09 NOTE — PROGRESS NOTES
Patient Office Visit      Patient Name: Tracey Shukla  : 1965   MRN: [id number]     Chief Complaint:    Chief Complaint   Patient presents with   • URI       History of Present Illness: Tracey Shukla is a 58 y.o. female who is here today for cough and cold symptoms that started yesterday.  She has a really sore throat with body aches.  She has not checked to see if she has a fever but she did not have a temperature here today.  She stayed awake last night with cough and she does have some drainage.    Subjective      Review of Systems:   Review of Systems   Constitutional: Positive for fatigue. Negative for fever.   HENT: Positive for congestion, postnasal drip and sore throat.    Respiratory: Positive for cough. Negative for shortness of breath.         Past Medical History:   Past Medical History:   Diagnosis Date   • Anxiety - PCP manages    • Diverticulosis     Seen incidentally at colonoscopy   • Frozen shoulder - right    • Goiter    • H/O migraine    • Insomnia        Past Surgical History:   Past Surgical History:   Procedure Laterality Date   • CLOSED MANIPULATION SHOULDER Right    • COLONOSCOPY  2015    10 YEARS   • INCISIONAL HERNIA REPAIR      From [facility] oophorectomy   • LAPAROSCOPIC SALPINGOOPHERECTOMY Left     Enlarged ovary - benign (Dr. Mir López)   • THYROID SURGERY      Goiter removed       Family History:   Family History   Problem Relation Age of Onset   • Lung cancer Mother    • Depression Father        Social History:   Social History     Socioeconomic History   • Marital status:    Tobacco Use   • Smoking status: Never   • Smokeless tobacco: Never   Vaping Use   • Vaping Use: Never used   Substance and Sexual Activity   • Alcohol use: Yes     Alcohol/week: 1.0 standard drink     Types: 1 Glasses of wine per week     Comment: daily   • Drug use: Never       Allergies:   No Known Allergies    Objective     Physical Exam:  Vital Signs:  "  Vitals:    06/09/23 1526   BP: 118/80   BP Location: Right arm   Patient Position: Sitting   Cuff Size: Adult   Pulse: 75   Resp: 15   Temp: 98.5 °F (36.9 °C)   TempSrc: Oral   SpO2: 97%   Weight: 83.2 kg (183 lb 8 oz)   Height: 177.8 cm (70\")     Body mass index is 26.33 kg/m².           Physical Exam    Procedures    Assessment / Plan      Assessment/Plan:   Diagnoses and all orders for this visit:    1. URI with cough and congestion (Primary)  -     POC Rapid Strep A  -     POCT SARS-CoV-2 Antigen HANS + Flu  -     promethazine-dextromethorphan (PROMETHAZINE-DM) 6.25-15 MG/5ML syrup; Take 5 mL by mouth 4 (Four) Times a Day As Needed for Cough.  Dispense: 120 mL; Refill: 1  -     azithromycin (ZITHROMAX) 250 MG tablet; 2 tablets by oral route day 1 then 1 tablet by oral route next 4 days  Dispense: 6 tablet; Refill: 0  -     Chlorcyclizine-Pseudoephed 25-60 MG tablet; Take 1 tablet by mouth 3 (Three) Times a Day As Needed (drainage and nasal congestion).  Dispense: 30 tablet; Refill: 1       Negative rapid COVID and flu.  I do recommend retesting for COVID again in a day or 2 with an at home test.  She does not qualify for paxlovid so treatment would still be symptomatic.  This is most likely viral but we will go ahead and prescribe Zithromax.    Medications:     Current Outpatient Medications:   •  Aimovig 70 MG/ML auto-injector, , Disp: , Rfl:   •  ALPRAZolam (XANAX) 0.5 MG tablet, Take 1 tablet by mouth 3 (Three) Times a Day As Needed for Anxiety., Disp: 60 tablet, Rfl: 0  •  cyclobenzaprine (FLEXERIL) 10 MG tablet, Take 1 tablet by mouth At Night As Needed for Muscle Spasms., Disp: 30 tablet, Rfl: 0  •  estradiol (Estrace) 1 MG tablet, Take 1 tablet by mouth Daily., Disp: 90 tablet, Rfl: 4  •  Fremanezumab-vfrm (Ajovy) 225 MG/1.5ML solution auto-injector, Inject 225 mg under the skin into the appropriate area as directed Every 30 (Thirty) Days., Disp: , Rfl:   •  ibuprofen (ADVIL,MOTRIN) 800 MG tablet, Take 1 " tablet by mouth Every 8 (Eight) Hours As Needed for Mild Pain., Disp: 90 tablet, Rfl: 0  •  medroxyPROGESTERone (Provera) 2.5 MG tablet, Take 1 tablet by mouth Daily., Disp: 90 tablet, Rfl: 4  •  omeprazole (priLOSEC) 20 MG capsule, TAKE ONE CAPSULE BY MOUTH DAILY, Disp: 30 capsule, Rfl: 1  •  rizatriptan (MAXALT) 10 MG tablet, rizatriptan 10 mg tablet, Disp: , Rfl:   •  sertraline (ZOLOFT) 50 MG tablet, TAKE ONE TABLET BY MOUTH DAILY, Disp: 90 tablet, Rfl: 0  •  topiramate (TOPAMAX) 50 MG tablet, topiramate 50 mg tablet, Disp: , Rfl:   •  zolpidem (AMBIEN) 10 MG tablet, Take 1 tablet by mouth At Night As Needed for Sleep., Disp: 30 tablet, Rfl: 2  •  azithromycin (ZITHROMAX) 250 MG tablet, 2 tablets by oral route day 1 then 1 tablet by oral route next 4 days, Disp: 6 tablet, Rfl: 0  •  Chlorcyclizine-Pseudoephed 25-60 MG tablet, Take 1 tablet by mouth 3 (Three) Times a Day As Needed (drainage and nasal congestion)., Disp: 30 tablet, Rfl: 1  •  promethazine-dextromethorphan (PROMETHAZINE-DM) 6.25-15 MG/5ML syrup, Take 5 mL by mouth 4 (Four) Times a Day As Needed for Cough., Disp: 120 mL, Rfl: 1    Current Facility-Administered Medications:   •  Fremanezumab-vfrm solution prefilled syringe 225 mg, 225 mg, Subcutaneous, Q30 Days, Alfredito Rocha MD, 225 mg at 12/27/22 0924        Follow Up:   No follow-ups on file.    Kamilah Johnson PA-C   [facility]

## 2023-08-12 ENCOUNTER — OFFICE VISIT (OUTPATIENT)
Dept: FAMILY MEDICINE CLINIC | Facility: CLINIC | Age: 58
End: 2023-08-12
Payer: COMMERCIAL

## 2023-08-12 VITALS
DIASTOLIC BLOOD PRESSURE: 86 MMHG | OXYGEN SATURATION: 98 % | BODY MASS INDEX: 25.73 KG/M2 | SYSTOLIC BLOOD PRESSURE: 140 MMHG | WEIGHT: 179.7 LBS | HEART RATE: 67 BPM | HEIGHT: 70 IN | TEMPERATURE: 98.2 F

## 2023-08-12 DIAGNOSIS — K57.92 DIVERTICULITIS: Primary | ICD-10-CM

## 2023-08-12 PROCEDURE — 99213 OFFICE O/P EST LOW 20 MIN: CPT | Performed by: NURSE PRACTITIONER

## 2023-08-12 RX ORDER — ALBUTEROL SULFATE 90 UG/1
2 AEROSOL, METERED RESPIRATORY (INHALATION) EVERY 4 HOURS PRN
COMMUNITY
End: 2023-08-12

## 2023-08-12 RX ORDER — BENZONATATE 100 MG/1
100 CAPSULE ORAL 3 TIMES DAILY PRN
COMMUNITY
End: 2023-08-12

## 2023-08-12 RX ORDER — AMOXICILLIN AND CLAVULANATE POTASSIUM 875; 125 MG/1; MG/1
1 TABLET, FILM COATED ORAL 2 TIMES DAILY
COMMUNITY
Start: 2023-08-09 | End: 2023-08-12

## 2023-08-12 RX ORDER — DEXTROMETHORPHAN HYDROBROMIDE AND PROMETHAZINE HYDROCHLORIDE 15; 6.25 MG/5ML; MG/5ML
5 SYRUP ORAL 4 TIMES DAILY PRN
COMMUNITY
End: 2023-08-12

## 2023-08-12 RX ORDER — METRONIDAZOLE 500 MG/1
500 TABLET ORAL 3 TIMES DAILY
Qty: 30 TABLET | Refills: 0 | Status: SHIPPED | OUTPATIENT
Start: 2023-08-12

## 2023-08-12 RX ORDER — OXYCODONE HYDROCHLORIDE 5 MG/1
5 TABLET ORAL EVERY 6 HOURS PRN
COMMUNITY
End: 2023-08-12

## 2023-08-12 RX ORDER — BROMPHENIRAMINE MALEATE, PSEUDOEPHEDRINE HYDROCHLORIDE, AND DEXTROMETHORPHAN HYDROBROMIDE 2; 30; 10 MG/5ML; MG/5ML; MG/5ML
5 SYRUP ORAL 4 TIMES DAILY PRN
COMMUNITY
End: 2023-08-12

## 2023-08-12 RX ORDER — CIPROFLOXACIN 500 MG/1
500 TABLET, FILM COATED ORAL 2 TIMES DAILY
Qty: 20 TABLET | Refills: 0 | Status: SHIPPED | OUTPATIENT
Start: 2023-08-12

## 2023-08-12 RX ORDER — AZITHROMYCIN 250 MG/1
250 TABLET, FILM COATED ORAL DAILY
COMMUNITY
End: 2023-08-12

## 2023-08-12 RX ORDER — PREDNISONE 20 MG/1
20 TABLET ORAL DAILY
COMMUNITY
End: 2023-08-12

## 2023-08-12 NOTE — PROGRESS NOTES
"Chief Complaint  GI Problem (X1W. Hx Diverticuitis. Swelling, tenderness in abd LLQ. Pt went to [facility] Wednesday and was prescribed Augmentin but pt thinks it is not helping her.)    Subjective        Tracey Shukla presents to [facility]  History of Present Illness She has a hx of this. She has been having symptoms for about a week. No fever. She has a hx of diverticuli. She is not having any bleeding. She c/o LLQ abdominal pain that is non-radiating.     Objective   Vital Signs:  /86 (BP Location: Left arm, Patient Position: Sitting, Cuff Size: Adult)   Pulse 67   Temp 98.2 øF (36.8 øC) (Oral)   Ht 177.8 cm (70\")   Wt 81.5 kg (179 lb 11.2 oz)   SpO2 98%   BMI 25.78 kg/mý   Estimated body mass index is 25.78 kg/mý as calculated from the following:    Height as of this encounter: 177.8 cm (70\").    Weight as of this encounter: 81.5 kg (179 lb 11.2 oz).               Physical Exam  Cardiovascular:      Rate and Rhythm: Normal rate and regular rhythm.   Pulmonary:      Effort: Pulmonary effort is normal.      Breath sounds: Normal breath sounds.   Abdominal:      General: There is no distension.      Tenderness: There is abdominal tenderness. There is no right CVA tenderness, left CVA tenderness, guarding or rebound.   Neurological:      General: No focal deficit present.      Mental Status: She is oriented to person, place, and time.   Psychiatric:         Mood and Affect: Mood normal.         Behavior: Behavior normal.      Result Review :                   Assessment and Plan   Diagnoses and all orders for this visit:    1. Diverticulitis (Primary)  Assessment & Plan:  Stop Augmentin and will start Cipro and Flagyl today. She is to to go ER for worsening symptoms.     Orders:  -     metroNIDAZOLE (Flagyl) 500 MG tablet; Take 1 tablet by mouth 3 (Three) Times a Day.  Dispense: 30 tablet; Refill: 0  -     ciprofloxacin (Cipro) 500 MG tablet; Take 1 tablet by mouth 2 (Two) Times a " Day.  Dispense: 20 tablet; Refill: 0             Follow Up   Return in about 2 weeks (around 8/26/2023) for Recheck.  Patient was given instructions and counseling regarding her condition or for health maintenance advice. Please see specific information pulled into the AVS if appropriate.

## 2023-08-17 DIAGNOSIS — R11.0 NAUSEA: Primary | ICD-10-CM

## 2023-08-17 RX ORDER — ONDANSETRON HYDROCHLORIDE 8 MG/1
8 TABLET, FILM COATED ORAL EVERY 8 HOURS PRN
Qty: 30 TABLET | Refills: 0 | Status: SHIPPED | OUTPATIENT
Start: 2023-08-17

## 2023-08-31 DIAGNOSIS — M62.838 MUSCLE SPASM: ICD-10-CM

## 2023-08-31 RX ORDER — CYCLOBENZAPRINE HCL 10 MG
10 TABLET ORAL NIGHTLY PRN
Qty: 30 TABLET | Refills: 0 | Status: SHIPPED | OUTPATIENT
Start: 2023-08-31

## 2023-08-31 NOTE — TELEPHONE ENCOUNTER
Rx Refill Note    Requested Prescriptions     Pending Prescriptions Disp Refills    cyclobenzaprine (FLEXERIL) 10 MG tablet 30 tablet 0     Sig: Take 1 tablet by mouth At Night As Needed for Muscle Spasms.        Last office visit with prescribing clinician: 4/21/2023      Next office visit with prescribing clinician: Visit date not found   Last labs:   Last refill: 10/18/2022   Pharmacy (be sure to add in Epic). correct

## 2023-09-05 NOTE — TELEPHONE ENCOUNTER
Rx Refill Note    Requested Prescriptions     Pending Prescriptions Disp Refills    sertraline (ZOLOFT) 50 MG tablet [Pharmacy Med Name: SERTRALINE HCL 50 MG TABLET] 90 tablet 0     Sig: TAKE 1 TABLET BY MOUTH DAILY        Last office visit with prescribing clinician: 4/21/2023      Next office visit with prescribing clinician: Visit date not found   Last labs:   Last refill: 06/09/2023   Pharmacy (be sure to add in Epic). correct

## 2023-10-11 DIAGNOSIS — M62.838 MUSCLE SPASM: ICD-10-CM

## 2023-10-12 RX ORDER — CYCLOBENZAPRINE HCL 10 MG
10 TABLET ORAL NIGHTLY PRN
Qty: 30 TABLET | Refills: 0 | Status: SHIPPED | OUTPATIENT
Start: 2023-10-12

## 2023-10-12 RX ORDER — IBUPROFEN 800 MG/1
800 TABLET ORAL EVERY 8 HOURS PRN
Qty: 90 TABLET | Refills: 0 | Status: SHIPPED | OUTPATIENT
Start: 2023-10-12

## 2023-10-12 NOTE — TELEPHONE ENCOUNTER
Rx Refill Note    Requested Prescriptions     Pending Prescriptions Disp Refills    ibuprofen (ADVIL,MOTRIN) 800 MG tablet [Pharmacy Med Name: IBUPROFEN 800 MG TABLET] 90 tablet 0     Sig: TAKE ONE TABLET BY MOUTH EVERY 8 HOURS AS NEEDED FOR MILD PAIN        Last office visit with prescribing clinician: 4/21/2023      Next office visit with prescribing clinician: 10/11/2023   Last labs:   Last refill: 06/08/2023   Pharmacy (be sure to add in Epic). correct

## 2023-10-20 ENCOUNTER — TELEPHONE (OUTPATIENT)
Dept: FAMILY MEDICINE CLINIC | Facility: CLINIC | Age: 58
End: 2023-10-20

## 2023-10-20 NOTE — TELEPHONE ENCOUNTER
"     Caller: Tracey Shukla \"Tracey Villanueva\"    Relationship to patient: Self    Best call back number: [phone]     Patient is needing: PATIENT CALLED TO SCHEDULE WITH DOCTOR EZEQUIEL FOR ACHY JOINTS.  PATIENT THINKS SHE HAS ARTHRITIS.    PATIENT PREFERS TO SEE DOCTOR EZEQUIEL.                "

## 2023-11-09 ENCOUNTER — OFFICE VISIT (OUTPATIENT)
Dept: FAMILY MEDICINE CLINIC | Facility: CLINIC | Age: 58
End: 2023-11-09
Payer: COMMERCIAL

## 2023-11-09 VITALS
BODY MASS INDEX: 26.04 KG/M2 | DIASTOLIC BLOOD PRESSURE: 90 MMHG | WEIGHT: 181.9 LBS | HEIGHT: 70 IN | HEART RATE: 83 BPM | OXYGEN SATURATION: 99 % | SYSTOLIC BLOOD PRESSURE: 124 MMHG | TEMPERATURE: 96.9 F

## 2023-11-09 DIAGNOSIS — Z79.899 HIGH RISK MEDICATION USE: ICD-10-CM

## 2023-11-09 DIAGNOSIS — F41.9 ANXIETY: ICD-10-CM

## 2023-11-09 DIAGNOSIS — M54.41 ACUTE RIGHT-SIDED LOW BACK PAIN WITH RIGHT-SIDED SCIATICA: Primary | ICD-10-CM

## 2023-11-09 DIAGNOSIS — F51.01 PRIMARY INSOMNIA: ICD-10-CM

## 2023-11-09 DIAGNOSIS — M25.561 ACUTE PAIN OF RIGHT KNEE: ICD-10-CM

## 2023-11-09 LAB
BUPRENORPHINE SERPL-MCNC: NEGATIVE NG/ML
PCP: NEGATIVE
POC AMPHETAMINES: NEGATIVE
POC BARBITURATES: NEGATIVE
POC BENZODIAZEPHINES: NEGATIVE
POC COCAINE: NEGATIVE
POC METHADONE: NEGATIVE
POC METHAMPHETAMINE SCREEN URINE: NEGATIVE
POC OPIATES: NEGATIVE
POC OXYCODONE: NEGATIVE
POC THC: NEGATIVE

## 2023-11-09 PROCEDURE — 80305 DRUG TEST PRSMV DIR OPT OBS: CPT | Performed by: FAMILY MEDICINE

## 2023-11-09 PROCEDURE — 99214 OFFICE O/P EST MOD 30 MIN: CPT | Performed by: FAMILY MEDICINE

## 2023-11-09 RX ORDER — ALPRAZOLAM 0.5 MG/1
0.5 TABLET ORAL 3 TIMES DAILY PRN
Qty: 60 TABLET | Refills: 0 | Status: SHIPPED | OUTPATIENT
Start: 2023-11-09

## 2023-11-09 RX ORDER — MELOXICAM 7.5 MG/1
7.5 TABLET ORAL DAILY
Qty: 30 TABLET | Refills: 1 | Status: SHIPPED | OUTPATIENT
Start: 2023-11-09

## 2023-11-09 RX ORDER — ZOLPIDEM TARTRATE 10 MG/1
10 TABLET ORAL NIGHTLY PRN
Qty: 30 TABLET | Refills: 2 | Status: SHIPPED | OUTPATIENT
Start: 2023-11-09

## 2023-11-09 NOTE — PROGRESS NOTES
Follow Up Office Visit      Patient Name: Tracey Shukla  : 1965   MRN: [id number]     Chief Complaint:    Chief Complaint   Patient presents with    Back Pain     Back, right leg, and right buttocks pain. It happens at night when she is trying to lay down and sleep. She is concerned about arthritis    Anxiety     Ambien and xanax refills       History of Present Illness: Tracey Shukla is a 58 y.o. female who is here today to   be evaluated for some right low back buttock pain that goes down her leg.  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  She has had no loss of bowel or bladder control no motor weakness.  She did have the horse injury back in March and is recovered.    She also needs refills on her Ambien generally takes just a half a tablet per day and Xanax for just as needed use.    She did not get her blood work done in July as expected but will do it today she is not fasting.    Review of Systems   Constitutional: Negative for fatigue and fever.   Respiratory: Negative for cough and shortness of breath.    Cardiovascular: Negative for chest pain and palpitations.   Skin: Negative for rash or itching      Subjective      Review of Systems:   Review of Systems    Past Medical History:   Past Medical History:   Diagnosis Date    Anxiety - PCP manages 2011    Depression 2011    Work, and family issues    Diverticulosis 2015    Seen incidentally at colonoscopy    Frozen shoulder - right 2017    Goiter     H/O migraine 2011    Headache 2000    Insomnia        Past Surgical History:   Past Surgical History:   Procedure Laterality Date    CLOSED MANIPULATION SHOULDER Right 2017    COLONOSCOPY  2015    10 YEARS    INCISIONAL HERNIA REPA[place]  2006    [place] [facility] [place]    LAPAROSCOPIC SALPINGOOPHERECTOMY Left     Enlarged ovary - benign (Dr. Mir López)    THYROID SURGERY  1978    Goiter removed       Family History:   Family History   Problem Relation Age of Onset    Lung  cancer Mother     Depression Father        Social History:   Social History     Socioeconomic History    Marital status:    Tobacco Use    Smoking status: Never     Passive exposure: Never    Smokeless tobacco: Never   Vaping Use    Vaping Use: Never used   Substance and Sexual Activity    Alcohol use: Yes     Alcohol/week: 1.0 standard drink of alcohol     Types: 1 Glasses of wine per week    Drug use: Never    Sexual activity: Yes     Partners: Male     Birth control/protection: Condom, Same-sex partner       Medications:     Current Outpatient Medications:     Aimovig 70 MG/ML auto-injector, , Disp: , Rfl:     ALPRAZolam (XANAX) 0.5 MG tablet, Take 1 tablet by mouth 3 (Three) Times a Day As Needed for Anxiety., Disp: 60 tablet, Rfl: 0    cyclobenzaprine (FLEXERIL) 10 MG tablet, Take 1 tablet by mouth At Night As Needed for Muscle Spasms., Disp: 30 tablet, Rfl: 0    estradiol (Estrace) 1 MG tablet, Take 1 tablet by mouth Daily., Disp: 90 tablet, Rfl: 4    ibuprofen (ADVIL,MOTRIN) 800 MG tablet, TAKE ONE TABLET BY MOUTH EVERY 8 HOURS AS NEEDED FOR MILD PAIN, Disp: 90 tablet, Rfl: 0    ibuprofen (ADVIL,MOTRIN) 800 MG tablet, Take 1 tablet by mouth Every 8 (Eight) Hours As Needed for Mild Pain., Disp: 90 tablet, Rfl: 0    medroxyPROGESTERone (Provera) 2.5 MG tablet, Take 1 tablet by mouth Daily., Disp: 90 tablet, Rfl: 4    mupirocin (BACTROBAN) 2 % ointment, Apply 1 application  topically to the appropriate area as directed As Needed., Disp: , Rfl:     rizatriptan (MAXALT) 10 MG tablet, rizatriptan 10 mg tablet, Disp: , Rfl:     sertraline (ZOLOFT) 50 MG tablet, TAKE 1 TABLET BY MOUTH DAILY, Disp: 90 tablet, Rfl: 1    topiramate (TOPAMAX) 50 MG tablet, Take 1 tablet by mouth 2 (Two) Times a Day., Disp: 180 tablet, Rfl: 1    zolpidem (AMBIEN) 10 MG tablet, Take 1 tablet by mouth At Night As Needed for Sleep., Disp: 30 tablet, Rfl: 2    meloxicam (Mobic) 7.5 MG tablet, Take 1 tablet by mouth Daily., Disp: 30  "tablet, Rfl: 1  No current facility-administered medications for this visit.    Allergies:   No Known Allergies    Objective     Physical Exam:  Vital Signs:   Vitals:    11/09/23 1125   BP: 124/90   BP Location: Right arm   Patient Position: Sitting   Cuff Size: Adult   Pulse: 83   Temp: 96.9 °F (36.1 °C)   TempSrc: Infrared   SpO2: 99%   Weight: 82.5 kg (181 lb 14.4 oz)   Height: 177.8 cm (70\")   PainSc:   3   PainLoc: Buttocks     Body mass index is 26.1 kg/m².     Physical Exam  Vitals and nursing note reviewed.   Constitutional:       Appearance: Normal appearance.   HENT:      Head: Normocephalic and atraumatic.      Nose: Nose normal.   Cardiovascular:      Rate and Rhythm: Normal rate and regular rhythm.   Pulmonary:      Effort: Pulmonary effort is normal.      Breath sounds: Normal breath sounds.   Musculoskeletal:         General: Normal range of motion.      Cervical back: Normal range of motion and neck supple.      Right lower leg: No edema.      Left lower leg: No edema.      Comments: Good straight leg raise in seated position good strength of both lower extremities no pain with hip rotation she does have some mild palpable crepitance of the knee with flexion extension ligaments appear to be intact of both knees  No effusion no swelling or palpable tenderness to the right knee.  Lumbar spine is nontender palpation does have some pain in the right upper buttock area to palpation.   Skin:     General: Skin is warm and dry.   Neurological:      General: No focal deficit present.      Mental Status: She is alert.   Psychiatric:         Mood and Affect: Mood normal.         Behavior: Behavior normal.         Procedures    PHQ-9 Total Score:       Assessment / Plan      Assessment/Plan:   Diagnoses and all orders for this visit:    1. Acute right-sided low back pain with right-sided sciatica (Primary)  -     XR Spine Lumbar 2 or 3 View; Future  -     meloxicam (Mobic) 7.5 MG tablet; Take 1 tablet by mouth " Daily.  Dispense: 30 tablet; Refill: 1    2. Primary insomnia  -     zolpidem (AMBIEN) 10 MG tablet; Take 1 tablet by mouth At Night As Needed for Sleep.  Dispense: 30 tablet; Refill: 2    3. Anxiety  -     ALPRAZolam (XANAX) 0.5 MG tablet; Take 1 tablet by mouth 3 (Three) Times a Day As Needed for Anxiety.  Dispense: 60 tablet; Refill: 0    4. High risk medication use  -     POC Urine Drug Screen, Triage  -     CBC Auto Differential  -     Comprehensive Metabolic Panel    5. Acute pain of right knee  -     XR Knee 1 or 2 View Right; Future         We will get an x-ray of her back and right knee as well.  We also gave her some handouts on sciatica and some exercises she can do    Refilled her Ambien and Xanax her [place] is appropriate UDS completed she signed the consent form    We will follow-up on her blood work that we did today as well    We will change her over to Mobic stop the ibuprofen she is not to take any other NSAIDs with this she may take Tylenol 1000 mg every 8 hours if she needs extra pain relief follow-up in 2 months sooner if worse.         Follow Up:   Return in about 2 months (around 1/9/2024).        Alfredito Rocha MD  [facility]   Portions of note created with Dragon voice recognition technology

## 2023-11-10 LAB
ALBUMIN SERPL-MCNC: 4.5 G/DL (ref 3.8–4.9)
ALBUMIN/GLOB SERPL: 2.1 {RATIO} (ref 1.2–2.2)
ALP SERPL-CCNC: 57 IU/L (ref 44–121)
ALT SERPL-CCNC: 16 IU/L (ref 0–32)
AST SERPL-CCNC: 18 IU/L (ref 0–40)
BASOPHILS # BLD AUTO: 0.1 X10E3/UL (ref 0–0.2)
BASOPHILS NFR BLD AUTO: 1 %
BILIRUB SERPL-MCNC: 0.5 MG/DL (ref 0–1.2)
BUN SERPL-MCNC: 16 MG/DL (ref 6–24)
BUN/CREAT SERPL: 17 (ref 9–23)
CALCIUM SERPL-MCNC: 9.3 MG/DL (ref 8.7–10.2)
CHLORIDE SERPL-SCNC: 106 MMOL/L (ref 96–106)
CO2 SERPL-SCNC: 23 MMOL/L (ref 20–29)
CREAT SERPL-MCNC: 0.93 MG/DL (ref 0.57–1)
EGFRCR SERPLBLD CKD-EPI 2021: 72 ML/MIN/1.73
EOSINOPHIL # BLD AUTO: 0.1 X10E3/UL (ref 0–0.4)
EOSINOPHIL NFR BLD AUTO: 2 %
ERYTHROCYTE [DISTWIDTH] IN BLOOD BY AUTOMATED COUNT: 11.8 % (ref 11.7–15.4)
GLOBULIN SER CALC-MCNC: 2.1 G/DL (ref 1.5–4.5)
GLUCOSE SERPL-MCNC: 90 MG/DL (ref 70–99)
HCT VFR BLD AUTO: 40.2 % (ref 34–46.6)
HGB BLD-MCNC: 13.8 G/DL (ref 11.1–15.9)
IMM GRANULOCYTES # BLD AUTO: 0 X10E3/UL (ref 0–0.1)
IMM GRANULOCYTES NFR BLD AUTO: 0 %
LYMPHOCYTES # BLD AUTO: 2.3 X10E3/UL (ref 0.7–3.1)
LYMPHOCYTES NFR BLD AUTO: 34 %
MCH RBC QN AUTO: 31.3 PG (ref 26.6–33)
MCHC RBC AUTO-ENTMCNC: 34.3 G/DL (ref 31.5–35.7)
MCV RBC AUTO: 91 FL (ref 79–97)
MONOCYTES # BLD AUTO: 0.4 X10E3/UL (ref 0.1–0.9)
MONOCYTES NFR BLD AUTO: 7 %
NEUTROPHILS # BLD AUTO: 3.8 X10E3/UL (ref 1.4–7)
NEUTROPHILS NFR BLD AUTO: 56 %
PLATELET # BLD AUTO: 257 X10E3/UL (ref 150–450)
POTASSIUM SERPL-SCNC: 4.2 MMOL/L (ref 3.5–5.2)
PROT SERPL-MCNC: 6.6 G/DL (ref 6–8.5)
RBC # BLD AUTO: 4.41 X10E6/UL (ref 3.77–5.28)
SODIUM SERPL-SCNC: 141 MMOL/L (ref 134–144)
WBC # BLD AUTO: 6.8 X10E3/UL (ref 3.4–10.8)

## 2023-11-29 DIAGNOSIS — M54.41 ACUTE RIGHT-SIDED LOW BACK PAIN WITH RIGHT-SIDED SCIATICA: ICD-10-CM

## 2023-11-30 RX ORDER — MELOXICAM 7.5 MG/1
7.5 TABLET ORAL DAILY
Qty: 30 TABLET | Refills: 1 | Status: SHIPPED | OUTPATIENT
Start: 2023-11-30

## 2023-11-30 NOTE — TELEPHONE ENCOUNTER
Rx Refill Note    Requested Prescriptions     Pending Prescriptions Disp Refills    meloxicam (Mobic) 7.5 MG tablet 30 tablet 1     Sig: Take 1 tablet by mouth Daily.        Last office visit with prescribing clinician: 11/9/2023      Next office visit with prescribing clinician: 1/9/2024   Last labs:   Last refill: patient states that you told her she could take 2 a day and now she is out, needing another refill   Pharmacy (be sure to add in Epic). correct

## 2023-12-15 ENCOUNTER — OFFICE VISIT (OUTPATIENT)
Dept: OBSTETRICS AND GYNECOLOGY | Facility: CLINIC | Age: 58
End: 2023-12-15
Payer: COMMERCIAL

## 2023-12-15 VITALS
BODY MASS INDEX: 25.97 KG/M2 | RESPIRATION RATE: 14 BRPM | WEIGHT: 181 LBS | DIASTOLIC BLOOD PRESSURE: 80 MMHG | SYSTOLIC BLOOD PRESSURE: 126 MMHG

## 2023-12-15 DIAGNOSIS — Z01.419 WELL WOMAN EXAM: Primary | ICD-10-CM

## 2023-12-15 DIAGNOSIS — Z71.85 VACCINE COUNSELING: ICD-10-CM

## 2023-12-15 DIAGNOSIS — Z12.11 SCREEN FOR COLON CANCER: ICD-10-CM

## 2023-12-15 RX ORDER — ESTRADIOL 0.5 MG/1
0.5 TABLET ORAL DAILY
Qty: 90 TABLET | Refills: 4 | Status: SHIPPED | OUTPATIENT
Start: 2023-12-15

## 2023-12-15 RX ORDER — MEDROXYPROGESTERONE ACETATE 2.5 MG/1
2.5 TABLET ORAL DAILY
Qty: 90 TABLET | Refills: 4 | Status: SHIPPED | OUTPATIENT
Start: 2023-12-15

## 2023-12-15 NOTE — PROGRESS NOTES
Subjective   Chief Complaint   Patient presents with    Gynecologic Exam     Tracey Shukla is a 58 y.o. year old  menopausal female presenting to be seen for her annual exam.      This past year she has been on hormone replacement therapy.  She has not had any vaginal bleeding in the last 12 months.  Menopausal symptoms are not present.  She is not opposed to trying to reduce the dose of hormone.    Her daughter did end up getting  this past year and things are going well.  Still living in [place].    Her father  at about 90 years of age having recently been diagnosed with colon cancer.  The first family member with colon cancer.    SEXUAL Hx:  She is currently sexually active.  In the past year there there has been NO new sexual partners.    Condoms are always used.  She would not like to be screened for STD's at today's exam.  [place] is painful: not always  HEALTH Hx:  She exercises regularly: yes.  She wears her seat belt: yes.  She has concerns about domestic violence: no.  She has noticed changes in height: no.    The following portions of the patient's history were reviewed and updated as appropriate:problem list, current medications, allergies, past family history, past medical history, past social history, and past surgical history.    Social History    Tobacco Use      Smoking status: Never      Smokeless tobacco: Never      Review of Systems  Constitutional POS: nothing reported    NEG: anorexia or night sweats   Genitourinary POS: nothing reported    NEG: dysuria or hematuria      Gastointestinal POS: nothing reported    NEG: bloating, change in bowel habits, melena, or reflux symptoms   Integument POS: nothing reported    NEG: moles that are changing in size, shape, color or rashes   Breast POS: nothing reported    NEG: persistent breast lump, skin dimpling, or nipple discharge        Objective   /80   Resp 14   Wt 82.1 kg (181 lb)   LMP  (LMP Unknown)   BMI 25.97 kg/m²      General:  well developed; well nourished  no acute distress   Skin:  No suspicious lesions seen   Thyroid: normal to inspection and palpation   Breasts:  Examined in supine position  Symmetric without masses or skin dimpling  Nipples normal without inversion, lesions or discharge  There are no palpable axillary nodes   Abdomen: soft, non-tender; no masses  no umbilical or inguinal hernias are present  no hepato-splenomegaly   Pelvis: Clinical staff was present for exam  External genitalia:  normal appearance of the external genitalia including Bartholin's and [place]'s glands.  :  urethral meatus normal;  Vaginal:  normal pink mucosa without prolapse or lesions.  Cervix:  normal appearance.  Uterus:  normal size, shape and consistency.  Adnexa:  normal bimanual exam of the adnexa.  Rectal:  digital rectal exam not performed; anus visually normal appearing.        Assessment   Normal GYN exam in menopause  Menopausal female currently on HRT - without significant symptoms affecting activities of daily living  Dyspareunia which may be related to irritation from condom  Family history of colon cancer/polyps - patient is due for screening colonoscopy       Plan   Pap was not done today.  I explained to Tracey that the recommendations for Pap smear interval in a low risk patient has lengthened to 3 years time.  I told Tracey she still needs to be seen in our office yearly for a full physical including breast and pelvic exam.  She was encouraged to get yearly mammograms.  She should report any palpable breast lump(s) or skin changes regardless of mammographic findings.  I explained to Tracey that notification regarding her mammogram results will come from the center performing the study.  Our office will not be routinely calling with mammogram results.  It is her responsibility to make sure that the results from the mammogram are communicated to her by the breast center.  If she has any questions about the results, she is  welcome to call our office anytime.  Colonoscopy was recommended for screening for colon cancer.  The procedure was briefly discussed and its benefits for early detection of colon cancer were emphasized.  I explained to Tracey that we could help her to schedule it if she wishes.  Additionally, she could also contact her primary care physician to help make this arrangement.  Because she is not at average risk for colon cancer, Cologuard screening would not be an option I would recommend.  After considering these options she wants help setting up her colonoscopy.  Referral will be made to Dr. Gamboa for outpatient colonoscopy.  Her vaccine record was reviewed and updated.  The importance of keeping all planned follow-up and taking all medications as prescribed was emphasized.  Follow up for annual exam 1 year    New Medications Ordered This Visit   Medications    estradiol (Estrace) 0.5 MG tablet     Sig: Take 1 tablet by mouth Daily.     Dispense:  90 tablet     Refill:  4    medroxyPROGESTERone (Provera) 2.5 MG tablet     Sig: Take 1 tablet by mouth Daily.     Dispense:  90 tablet     Refill:  4          This note was electronically signed.    Jose Maria Hines M.D.  December 15, 2023    Part of this note may be an electronic transcription/translation of spoken language to printed text using the Dragon Dictation System.

## 2024-01-02 RX ORDER — TOPIRAMATE 50 MG/1
50 TABLET, FILM COATED ORAL 2 TIMES DAILY
Qty: 180 TABLET | Refills: 1 | Status: SHIPPED | OUTPATIENT
Start: 2024-01-02

## 2024-01-04 RX ORDER — TOPIRAMATE 50 MG/1
50 TABLET, FILM COATED ORAL 2 TIMES DAILY
Qty: 180 TABLET | Refills: 1 | OUTPATIENT
Start: 2024-01-04

## 2024-01-09 ENCOUNTER — OFFICE VISIT (OUTPATIENT)
Dept: FAMILY MEDICINE CLINIC | Facility: CLINIC | Age: 59
End: 2024-01-09
Payer: COMMERCIAL

## 2024-01-09 VITALS
SYSTOLIC BLOOD PRESSURE: 140 MMHG | HEIGHT: 70 IN | BODY MASS INDEX: 25.05 KG/M2 | HEART RATE: 65 BPM | OXYGEN SATURATION: 98 % | DIASTOLIC BLOOD PRESSURE: 96 MMHG | WEIGHT: 175 LBS

## 2024-01-09 DIAGNOSIS — F51.01 PRIMARY INSOMNIA: ICD-10-CM

## 2024-01-09 DIAGNOSIS — M51.36 DDD (DEGENERATIVE DISC DISEASE), LUMBAR: Primary | ICD-10-CM

## 2024-01-09 DIAGNOSIS — M17.10 ARTHRITIS OF KNEE: ICD-10-CM

## 2024-01-09 DIAGNOSIS — F41.9 ANXIETY: ICD-10-CM

## 2024-01-09 DIAGNOSIS — Z79.899 HIGH RISK MEDICATION USE: ICD-10-CM

## 2024-01-09 DIAGNOSIS — I49.3 PVC'S (PREMATURE VENTRICULAR CONTRACTIONS): ICD-10-CM

## 2024-01-09 RX ORDER — ALPRAZOLAM 0.5 MG/1
0.5 TABLET ORAL 3 TIMES DAILY PRN
Qty: 60 TABLET | Refills: 0 | Status: SHIPPED | OUTPATIENT
Start: 2024-01-09

## 2024-01-09 RX ORDER — ZOLPIDEM TARTRATE 10 MG/1
10 TABLET ORAL NIGHTLY PRN
Qty: 30 TABLET | Refills: 2 | Status: SHIPPED | OUTPATIENT
Start: 2024-01-09

## 2024-01-09 NOTE — PROGRESS NOTES
Follow Up Office Visit      Patient Name: Tracey Shukla  : 1965   MRN: [id number]     Chief Complaint:    Chief Complaint   Patient presents with    Follow-up       History of Present Illness: Tracey Shukla is a 58 y.o. female who is here today to   follow-up on her chronic medical problems and to go over her x-rays.  We did review her knee x-ray and back x-ray in detail she does have some degenerative disc disease and anterolisthesis L3 on 4 and severe arthritis in her knee.  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 and her back and joints feel much better.  She relates that she does miss seeing the horses though.  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  She feels her blood pressure is normally better controlled at home.    Review of Systems   Constitutional: Negative for fatigue and fever.   Respiratory: Negative for cough and shortness of breath.    Cardiovascular: Negative for chest pain and palpitations.   Skin: Negative for rash or itching    She relates she is only rarely having to take Advil or meloxicam now.    Subjective      Review of Systems:   Review of Systems    Past Medical History:   Past Medical History:   Diagnosis Date    Anxiety - PCP manages     Diverticulosis     Seen incidentally at colonoscopy    Frozen shoulder - right 2017    Goiter     H/O migraine     Insomnia     Rib fractures - kicked by a race horse 2023       Past Surgical History:   Past Surgical History:   Procedure Laterality Date    CLOSED MANIPULATION SHOULDER Right 2017    COLONOSCOPY  2015    10 YEARS    INCISIONAL HERNIA REPAIR  2006    From the [facility] [place]    LAPAROSCOPIC SALPINGOOPHERECTOMY Left     Enlarged ovary - benign (Dr. Mir López)    THYROID SURGERY  1978    Goiter removed       Family  History:   Family History   Problem Relation Age of Onset    Depression Father     Colon cancer Father 88    Lung cancer Mother        Social History:   Social History     Socioeconomic History    Marital status:    Tobacco Use    Smoking status: Never     Passive exposure: Never    Smokeless tobacco: Never   Vaping Use    Vaping Use: Never used   Substance and Sexual Activity    Alcohol use: Yes     Alcohol/week: 1.0 standard drink of alcohol     Types: 1 Glasses of wine per week    Drug use: Never    Sexual activity: Yes     Partners: Male     Birth control/protection: Condom, Same-sex partner       Medications:     Current Outpatient Medications:     ALPRAZolam (XANAX) 0.5 MG tablet, Take 1 tablet by mouth 3 (Three) Times a Day As Needed for Anxiety., Disp: 60 tablet, Rfl: 0    zolpidem (AMBIEN) 10 MG tablet, Take 1 tablet by mouth At Night As Needed for Sleep., Disp: 30 tablet, Rfl: 2    Aimovig 70 MG/ML auto-injector, , Disp: , Rfl:     cyclobenzaprine (FLEXERIL) 10 MG tablet, Take 1 tablet by mouth At Night As Needed for Muscle Spasms., Disp: 30 tablet, Rfl: 0    estradiol (Estrace) 0.5 MG tablet, Take 1 tablet by mouth Daily., Disp: 90 tablet, Rfl: 4    ibuprofen (ADVIL,MOTRIN) 800 MG tablet, TAKE ONE TABLET BY MOUTH EVERY 8 HOURS AS NEEDED FOR MILD PAIN, Disp: 90 tablet, Rfl: 0    medroxyPROGESTERone (Provera) 2.5 MG tablet, Take 1 tablet by mouth Daily., Disp: 90 tablet, Rfl: 4    meloxicam (Mobic) 7.5 MG tablet, Take 1 tablet by mouth Daily., Disp: 30 tablet, Rfl: 1    rizatriptan (MAXALT) 10 MG tablet, rizatriptan 10 mg tablet, Disp: , Rfl:     sertraline (ZOLOFT) 50 MG tablet, TAKE 1 TABLET BY MOUTH DAILY, Disp: 90 tablet, Rfl: 1    topiramate (TOPAMAX) 50 MG tablet, TAKE 1 TABLET BY MOUTH TWICE A DAY, Disp: 180 tablet, Rfl: 1    Allergies:   No Known Allergies    Objective     Physical Exam:  Vital Signs:   Vitals:    01/09/24 1508 01/09/24 1536   BP: 150/96 140/96   BP Location: Right arm   "  Patient Position: Sitting    Cuff Size: Adult Adult   Pulse: 65    SpO2: 98%    Weight: 79.4 kg (175 lb)    Height: 177.8 cm (70\")      Body mass index is 25.11 kg/m².     Physical Exam  Vitals and nursing note reviewed.   Constitutional:       Appearance: Normal appearance.      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   HENT:      Head: Normocephalic and atraumatic.      Nose: Nose normal.   Cardiovascular:      Rate and Rhythm: Normal rate and regular rhythm.      Comments: Occasional PVC heard  Pulmonary:      Effort: Pulmonary effort is normal.      Breath sounds: Normal breath sounds.   Musculoskeletal:         General: Normal range of motion.      Cervical back: Normal range of motion and neck supple.      Right lower leg: No edema.      Left lower leg: No edema.   Skin:     General: Skin is warm and dry.   Neurological:      General: No focal deficit present.      Mental Status: She is alert.   Psychiatric:         Mood and Affect: Mood normal.         Behavior: Behavior normal.         Procedures    PHQ-9 Total Score:       Assessment / Plan      Assessment/Plan:   Diagnoses and all orders for this visit:    1. DDD (degenerative disc disease), lumbar (Primary)    2. Anxiety  -     ALPRAZolam (XANAX) 0.5 MG tablet; Take 1 tablet by mouth 3 (Three) Times a Day As Needed for Anxiety.  Dispense: 60 tablet; Refill: 0    3. Primary insomnia  -     zolpidem (AMBIEN) 10 MG tablet; Take 1 tablet by mouth At Night As Needed for Sleep.  Dispense: 30 tablet; Refill: 2    4. PVC's (premature ventricular contractions)    5. Arthritis of knee    6. High risk medication use  -     CBC Auto Differential; Future  -     Comprehensive Metabolic Panel; Future         She signed the consent form [place] is appropriate UDS up-to-date will continue with Xanax and Ambien as directed.  She is " stable on both those medicines.    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    Medications risk benefits side effects discussed with her she agrees to proceed.    Follow-up in 3 months for repeat blood work and evaluation         Follow Up:   Return in about 17 weeks (around 5/7/2024) for Labs prior next visit, Annual physical.        Alfredito Rocha MD  [facility]   Portions of note created with Dragon voice recognition technology

## 2024-01-20 DIAGNOSIS — M54.41 ACUTE RIGHT-SIDED LOW BACK PAIN WITH RIGHT-SIDED SCIATICA: ICD-10-CM

## 2024-01-22 RX ORDER — MELOXICAM 7.5 MG/1
7.5 TABLET ORAL DAILY
Qty: 30 TABLET | Refills: 1 | Status: SHIPPED | OUTPATIENT
Start: 2024-01-22

## 2024-02-10 ENCOUNTER — OFFICE VISIT (OUTPATIENT)
Dept: FAMILY MEDICINE CLINIC | Facility: CLINIC | Age: 59
End: 2024-02-10
Payer: COMMERCIAL

## 2024-02-10 VITALS
OXYGEN SATURATION: 99 % | BODY MASS INDEX: 26.1 KG/M2 | TEMPERATURE: 98.1 F | HEIGHT: 70 IN | DIASTOLIC BLOOD PRESSURE: 94 MMHG | WEIGHT: 182.3 LBS | HEART RATE: 87 BPM | SYSTOLIC BLOOD PRESSURE: 116 MMHG

## 2024-02-10 DIAGNOSIS — H93.13 TINNITUS OF BOTH EARS: Primary | ICD-10-CM

## 2024-02-10 PROCEDURE — 99213 OFFICE O/P EST LOW 20 MIN: CPT | Performed by: NURSE PRACTITIONER

## 2024-02-10 RX ORDER — GALCANEZUMAB 120 MG/ML
INJECTION, SOLUTION SUBCUTANEOUS
COMMUNITY

## 2024-02-10 NOTE — PROGRESS NOTES
"Chief Complaint  Tinnitus (B. X1W)    Subjective        Tracey Shukla presents to [facility]  Tinnitus     Tinnitus for over a week. She has used several otc meds with no relief. She has decreased hearing and fullness in both ears.     Objective   Vital Signs:  /94 (BP Location: Left arm, Patient Position: Sitting, Cuff Size: Adult)   Pulse 87   Temp 98.1 °F (36.7 °C) (Oral)   Ht 177.8 cm (70\")   Wt 82.7 kg (182 lb 4.8 oz)   SpO2 99%   BMI 26.16 kg/m²   Estimated body mass index is 26.16 kg/m² as calculated from the following:    Height as of this encounter: 177.8 cm (70\").    Weight as of this encounter: 82.7 kg (182 lb 4.8 oz).               Physical Exam  Constitutional:       Appearance: Normal appearance.   HENT:      Right Ear: Ear canal and external ear normal.      Left Ear: Ear canal and external ear normal.      Ears:      Comments: Both ears with bulging TM's. No signs of infection.      Nose: Rhinorrhea present.      Mouth/Throat:      Mouth: Mucous membranes are moist. Mucous membranes are dry.   Cardiovascular:      Rate and Rhythm: Normal rate and regular rhythm.   Pulmonary:      Effort: Pulmonary effort is normal.   Musculoskeletal:      Cervical back: Normal range of motion.   Neurological:      General: No focal deficit present.        Result Review :                     Assessment and Plan     Diagnoses and all orders for this visit:    1. Tinnitus of both ears (Primary)  Assessment & Plan:  Use Sudafed otc and ibuprofen for swelling. She is to f/u for worsening symptoms. Will refer to ENT if not better.                Follow Up     Return if symptoms worsen or fail to improve.  Patient was given instructions and counseling regarding her condition or for health maintenance advice. Please see specific information pulled into the AVS if appropriate.       She has had ringing in both ears for a week. She states that her hearing is muffled and she is stopped " up. No fever, chills. She has been taking several otc meds with no relief.

## 2024-02-10 NOTE — ASSESSMENT & PLAN NOTE
Use Sudafed otc and ibuprofen for swelling. She is to f/u for worsening symptoms. Will refer to ENT if not better.

## 2024-02-15 RX ORDER — AZELASTINE HCL 205.5 UG/1
2 SPRAY NASAL DAILY PRN
Qty: 23 ML | Refills: 5 | Status: SHIPPED | OUTPATIENT
Start: 2024-02-15

## 2024-03-04 ENCOUNTER — TELEPHONE (OUTPATIENT)
Dept: OBSTETRICS AND GYNECOLOGY | Facility: CLINIC | Age: 59
End: 2024-03-04
Payer: COMMERCIAL

## 2024-03-04 RX ORDER — PROGESTERONE 100 MG/1
100 CAPSULE ORAL DAILY
Qty: 90 CAPSULE | Refills: 4 | OUTPATIENT
Start: 2024-03-04

## 2024-03-04 NOTE — TELEPHONE ENCOUNTER
Spoke with pt and she stated that she is taking the Provera.  I advised her that she should have a year of refills at the pharmacy.  Pt stated that she would give them a call and if they said they didn't she would give me a call back.

## 2024-03-04 NOTE — TELEPHONE ENCOUNTER
"RAVEN    Received a call from patient whom stated her prescription for refill of Progesterone 100 MG was denied by her pharmacy for renewal. Per patient, her pharmacy was unsure also, as to why it was considered \"not appropriate.\" Patient is inquiring as to why her medication has been denied refill.   "

## 2024-03-04 NOTE — TELEPHONE ENCOUNTER
My notes had her taking Provera 2.5 mg tablets, not progesterone 100 mg?  Can you find out which of these 2 medications she has been taking?

## 2024-04-19 ENCOUNTER — LAB (OUTPATIENT)
Dept: FAMILY MEDICINE CLINIC | Facility: CLINIC | Age: 59
End: 2024-04-19
Payer: COMMERCIAL

## 2024-04-19 DIAGNOSIS — Z79.899 HIGH RISK MEDICATION USE: ICD-10-CM

## 2024-04-19 PROCEDURE — 36415 COLL VENOUS BLD VENIPUNCTURE: CPT | Performed by: FAMILY MEDICINE

## 2024-04-20 LAB
ALBUMIN SERPL-MCNC: 4 G/DL (ref 3.8–4.9)
ALBUMIN/GLOB SERPL: 2 {RATIO} (ref 1.2–2.2)
ALP SERPL-CCNC: 59 IU/L (ref 44–121)
ALT SERPL-CCNC: 15 IU/L (ref 0–32)
AST SERPL-CCNC: 16 IU/L (ref 0–40)
BASOPHILS # BLD AUTO: 0.1 X10E3/UL (ref 0–0.2)
BASOPHILS NFR BLD AUTO: 2 %
BILIRUB SERPL-MCNC: 0.5 MG/DL (ref 0–1.2)
BUN SERPL-MCNC: 17 MG/DL (ref 6–24)
BUN/CREAT SERPL: 21 (ref 9–23)
CALCIUM SERPL-MCNC: 9.2 MG/DL (ref 8.7–10.2)
CHLORIDE SERPL-SCNC: 112 MMOL/L (ref 96–106)
CO2 SERPL-SCNC: 22 MMOL/L (ref 20–29)
CREAT SERPL-MCNC: 0.81 MG/DL (ref 0.57–1)
EGFRCR SERPLBLD CKD-EPI 2021: 84 ML/MIN/1.73
EOSINOPHIL # BLD AUTO: 0.1 X10E3/UL (ref 0–0.4)
EOSINOPHIL NFR BLD AUTO: 3 %
ERYTHROCYTE [DISTWIDTH] IN BLOOD BY AUTOMATED COUNT: 12.2 % (ref 11.7–15.4)
GLOBULIN SER CALC-MCNC: 2 G/DL (ref 1.5–4.5)
GLUCOSE SERPL-MCNC: 88 MG/DL (ref 70–99)
HCT VFR BLD AUTO: 41.4 % (ref 34–46.6)
HGB BLD-MCNC: 13.8 G/DL (ref 11.1–15.9)
IMM GRANULOCYTES # BLD AUTO: 0 X10E3/UL (ref 0–0.1)
IMM GRANULOCYTES NFR BLD AUTO: 0 %
LYMPHOCYTES # BLD AUTO: 1.8 X10E3/UL (ref 0.7–3.1)
LYMPHOCYTES NFR BLD AUTO: 39 %
MCH RBC QN AUTO: 31.2 PG (ref 26.6–33)
MCHC RBC AUTO-ENTMCNC: 33.3 G/DL (ref 31.5–35.7)
MCV RBC AUTO: 94 FL (ref 79–97)
MONOCYTES # BLD AUTO: 0.4 X10E3/UL (ref 0.1–0.9)
MONOCYTES NFR BLD AUTO: 8 %
NEUTROPHILS # BLD AUTO: 2.2 X10E3/UL (ref 1.4–7)
NEUTROPHILS NFR BLD AUTO: 48 %
PLATELET # BLD AUTO: 291 X10E3/UL (ref 150–450)
POTASSIUM SERPL-SCNC: 4.9 MMOL/L (ref 3.5–5.2)
PROT SERPL-MCNC: 6 G/DL (ref 6–8.5)
RBC # BLD AUTO: 4.43 X10E6/UL (ref 3.77–5.28)
SODIUM SERPL-SCNC: 144 MMOL/L (ref 134–144)
WBC # BLD AUTO: 4.6 X10E3/UL (ref 3.4–10.8)

## 2024-04-28 DIAGNOSIS — M62.838 MUSCLE SPASM: ICD-10-CM

## 2024-04-28 DIAGNOSIS — M54.41 ACUTE RIGHT-SIDED LOW BACK PAIN WITH RIGHT-SIDED SCIATICA: ICD-10-CM

## 2024-04-29 RX ORDER — CYCLOBENZAPRINE HCL 10 MG
10 TABLET ORAL NIGHTLY PRN
Qty: 30 TABLET | Refills: 0 | Status: SHIPPED | OUTPATIENT
Start: 2024-04-29

## 2024-04-29 RX ORDER — MELOXICAM 7.5 MG/1
7.5 TABLET ORAL DAILY
Qty: 30 TABLET | Refills: 0 | Status: SHIPPED | OUTPATIENT
Start: 2024-04-29 | End: 2024-04-30 | Stop reason: SDUPTHER

## 2024-04-30 ENCOUNTER — OFFICE VISIT (OUTPATIENT)
Dept: FAMILY MEDICINE CLINIC | Facility: CLINIC | Age: 59
End: 2024-04-30
Payer: COMMERCIAL

## 2024-04-30 VITALS
BODY MASS INDEX: 26.63 KG/M2 | OXYGEN SATURATION: 98 % | DIASTOLIC BLOOD PRESSURE: 80 MMHG | HEART RATE: 57 BPM | SYSTOLIC BLOOD PRESSURE: 132 MMHG | HEIGHT: 70 IN | WEIGHT: 186 LBS

## 2024-04-30 DIAGNOSIS — T75.3XXA MOTION SICKNESS, INITIAL ENCOUNTER: ICD-10-CM

## 2024-04-30 DIAGNOSIS — M54.41 ACUTE RIGHT-SIDED LOW BACK PAIN WITH RIGHT-SIDED SCIATICA: ICD-10-CM

## 2024-04-30 DIAGNOSIS — E78.5 HYPERLIPIDEMIA, UNSPECIFIED HYPERLIPIDEMIA TYPE: ICD-10-CM

## 2024-04-30 DIAGNOSIS — M19.91 PRIMARY OSTEOARTHRITIS, UNSPECIFIED SITE: ICD-10-CM

## 2024-04-30 DIAGNOSIS — Z00.00 ANNUAL PHYSICAL EXAM: Primary | ICD-10-CM

## 2024-04-30 DIAGNOSIS — F51.01 PRIMARY INSOMNIA: ICD-10-CM

## 2024-04-30 DIAGNOSIS — G43.809 OTHER MIGRAINE WITHOUT STATUS MIGRAINOSUS, NOT INTRACTABLE: ICD-10-CM

## 2024-04-30 DIAGNOSIS — F41.9 ANXIETY: ICD-10-CM

## 2024-04-30 DIAGNOSIS — Z79.899 HIGH RISK MEDICATION USE: ICD-10-CM

## 2024-04-30 PROCEDURE — 99213 OFFICE O/P EST LOW 20 MIN: CPT | Performed by: FAMILY MEDICINE

## 2024-04-30 PROCEDURE — 99396 PREV VISIT EST AGE 40-64: CPT | Performed by: FAMILY MEDICINE

## 2024-04-30 RX ORDER — MELOXICAM 7.5 MG/1
7.5 TABLET ORAL DAILY
Qty: 90 TABLET | Refills: 1 | Status: SHIPPED | OUTPATIENT
Start: 2024-04-30

## 2024-04-30 RX ORDER — SCOLOPAMINE TRANSDERMAL SYSTEM 1 MG/1
1 PATCH, EXTENDED RELEASE TRANSDERMAL
Qty: 10 EACH | Refills: 0 | Status: SHIPPED | OUTPATIENT
Start: 2024-04-30

## 2024-04-30 RX ORDER — ZOLPIDEM TARTRATE 10 MG/1
10 TABLET ORAL NIGHTLY PRN
Qty: 30 TABLET | Refills: 2 | Status: SHIPPED | OUTPATIENT
Start: 2024-04-30

## 2024-04-30 RX ORDER — OMEPRAZOLE 20 MG/1
20 CAPSULE, DELAYED RELEASE ORAL DAILY
Qty: 90 CAPSULE | Refills: 1 | Status: SHIPPED | OUTPATIENT
Start: 2024-04-30

## 2024-04-30 RX ORDER — TOPIRAMATE 50 MG/1
50 TABLET, FILM COATED ORAL 2 TIMES DAILY
Qty: 180 TABLET | Refills: 1 | Status: SHIPPED | OUTPATIENT
Start: 2024-04-30

## 2024-04-30 RX ORDER — ALPRAZOLAM 0.5 MG/1
0.5 TABLET ORAL 3 TIMES DAILY PRN
Qty: 60 TABLET | Refills: 0 | Status: SHIPPED | OUTPATIENT
Start: 2024-04-30

## 2024-04-30 NOTE — PROGRESS NOTES
Follow Up Office Visit      Patient Name: Tracey Shukla  : 1965   MRN: [id number]     Chief Complaint:    Chief Complaint   Patient presents with    Anxiety    Insomnia       History of Present Illness: Tracey Shukla is a 59 y.o. female who is here today to   follow-up on her chronic medical problems and to go over blood work and she is also here for annual physical.  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  She was little concerned about her weight gain since she has not been as physically active shoveling out horse stalls etc.    Typically just uses half an Ambien at night and then uses her other benzodiazepine for anxiety as needed    She is been doing well on meds and is stable.    Review of Systems   Constitutional: Negative for fatigue and fever.   Respiratory: Negative for cough and shortness of breath.    Cardiovascular: Negative for chest pain and palpitations.   Skin: Negative for rash or itching    Says she does struggle with arthritis knees and hips but her back is better now.  Migraines doing well on her current regiment    Subjective      Review of Systems:   Review of Systems    Past Medical History:   Past Medical History:   Diagnosis Date    Anxiety - PCP manages     Diverticulosis     Seen incidentally at colonoscopy    Frozen shoulder - right     Goiter 1978    H/O migraine     Insomnia     Rib fractures - kicked by a race horse 2023       Past Surgical History:   Past Surgical History:   Procedure Laterality Date    CLOSED MANIPULATION SHOULDER Right 2017    COLONOSCOPY  2015    10 YEARS    INCISIONAL HERNIA REPAIR  2006    [facility]    LAPAROSCOPIC SALPINGOOPHERECTOMY Left     Enlarged ovary - benign (Dr. Mir López)    THYROID  SURGERY  1978    Goiter removed       Family History:   Family History   Problem Relation Age of Onset    Depression Father     Colon cancer Father 88    Lung cancer Mother        Social History:   Social History     Socioeconomic History    Marital status:    Tobacco Use    Smoking status: Never     Passive exposure: Never    Smokeless tobacco: Never   Vaping Use    Vaping status: Never Used   Substance and Sexual Activity    Alcohol use: Yes     Alcohol/week: 7.0 standard drinks of alcohol     Types: 7 Glasses of wine per week    Drug use: Never    Sexual activity: Yes     Partners: Male     Birth control/protection: Condom, Same-sex partner       Medications:     Current Outpatient Medications:     ALPRAZolam (XANAX) 0.5 MG tablet, Take 1 tablet by mouth 3 (Three) Times a Day As Needed for Anxiety., Disp: 60 tablet, Rfl: 0    meloxicam (Mobic) 7.5 MG tablet, Take 1 tablet by mouth Daily., Disp: 90 tablet, Rfl: 1    sertraline (ZOLOFT) 50 MG tablet, Take 1 tablet by mouth Daily., Disp: 90 tablet, Rfl: 3    topiramate (TOPAMAX) 50 MG tablet, Take 1 tablet by mouth 2 (Two) Times a Day., Disp: 180 tablet, Rfl: 1    zolpidem (AMBIEN) 10 MG tablet, Take 1 tablet by mouth At Night As Needed for Sleep., Disp: 30 tablet, Rfl: 2    azelastine (ASTEPRO) 0.15 % solution nasal spray, 2 sprays into the nostril(s) as directed by provider Daily As Needed for Rhinitis., Disp: 23 mL, Rfl: 5    cyclobenzaprine (FLEXERIL) 10 MG tablet, Take 1 tablet by mouth At Night As Needed for Muscle Spasms., Disp: 30 tablet, Rfl: 0    estradiol (Estrace) 0.5 MG tablet, Take 1 tablet by mouth Daily., Disp: 90 tablet, Rfl: 4    galcanezumab-gnlm (Emgality) 120 MG/ML auto-injector pen, Inject  under the skin into the appropriate area as directed., Disp: , Rfl:     ibuprofen (ADVIL,MOTRIN) 800 MG tablet, TAKE ONE TABLET BY MOUTH EVERY 8 HOURS AS NEEDED FOR MILD PAIN, Disp: 90 tablet, Rfl: 0    medroxyPROGESTERone (Provera) 2.5 MG tablet,  "Take 1 tablet by mouth Daily., Disp: 90 tablet, Rfl: 4    omeprazole (priLOSEC) 20 MG capsule, Take 1 capsule by mouth Daily. When on mobic, Disp: 90 capsule, Rfl: 1    rizatriptan (MAXALT) 10 MG tablet, rizatriptan 10 mg tablet, Disp: , Rfl:     Scopolamine 1 MG/3DAYS patch, Place 1 patch on the skin as directed by provider Every 72 (Seventy-Two) Hours., Disp: 10 each, Rfl: 0    Allergies:   No Known Allergies    Objective     Physical Exam:  Vital Signs:   Vitals:    04/30/24 1402   BP: 132/80   BP Location: Left arm   Patient Position: Sitting   Cuff Size: Adult   Pulse: 57   SpO2: 98%   Weight: 84.4 kg (186 lb)   Height: 177.8 cm (70\")     Facility age limit for growth %graciela is 20 years.  Body mass index is 26.69 kg/m².     Physical Exam  Vitals and nursing note reviewed.   Constitutional:       General: She is not in acute distress.     Appearance: Normal appearance.   HENT:      Head: Normocephalic and atraumatic.      Right Ear: Tympanic membrane, ear canal and external ear normal.      Left Ear: Tympanic membrane, ear canal and external ear normal.      Nose: Nose normal.      Mouth/Throat:      Mouth: Mucous membranes are moist.      Pharynx: Oropharynx is clear.   Eyes:      Extraocular Movements: Extraocular movements intact.      Conjunctiva/sclera: Conjunctivae normal.      Pupils: Pupils are equal, round, and reactive to light.   Neck:      Thyroid: No thyroid mass or thyroid tenderness.   Cardiovascular:      Rate and Rhythm: Normal rate and regular rhythm.      Heart sounds: Normal heart sounds.      Comments: RADIAL PULSES NML  Pulmonary:      Effort: Pulmonary effort is normal.      Breath sounds: Normal breath sounds.   Abdominal:      General: Abdomen is flat. Bowel sounds are normal.      Palpations: Abdomen is soft. There is no mass.      Tenderness: There is no abdominal tenderness. There is no guarding or rebound.   Musculoskeletal:      Cervical back: Normal range of motion and neck supple. "      Right lower leg: No edema.      Left lower leg: No edema.   Lymphadenopathy:      Cervical: No cervical adenopathy.   Skin:     General: Skin is warm and dry.      Findings: No rash.   Neurological:      General: No focal deficit present.      Mental Status: She is alert and oriented to person, place, and time.      Sensory: Sensation is intact.      Motor: Motor function is intact.      Deep Tendon Reflexes: Reflexes normal.      Comments: SYMMETRIC PATELLAR REFLEXES  Cranial nerves 2 through 12 intact   Psychiatric:         Attention and Perception: Attention normal.         Mood and Affect: Mood normal.         Behavior: Behavior normal.         Thought Content: Thought content normal.         Judgment: Judgment normal.         Procedures    PHQ-9 Total Score:       Assessment / Plan      Assessment/Plan:   Diagnoses and all orders for this visit:    1. Annual physical exam (Primary)    2. Motion sickness, initial encounter  -     Scopolamine 1 MG/3DAYS patch; Place 1 patch on the skin as directed by provider Every 72 (Seventy-Two) Hours.  Dispense: 10 each; Refill: 0    3. Anxiety  -     ALPRAZolam (XANAX) 0.5 MG tablet; Take 1 tablet by mouth 3 (Three) Times a Day As Needed for Anxiety.  Dispense: 60 tablet; Refill: 0  -     sertraline (ZOLOFT) 50 MG tablet; Take 1 tablet by mouth Daily.  Dispense: 90 tablet; Refill: 3    4. Acute right-sided low back pain with right-sided sciatica  -     meloxicam (Mobic) 7.5 MG tablet; Take 1 tablet by mouth Daily.  Dispense: 90 tablet; Refill: 1    5. Other migraine without status migrainosus, not intractable  -     topiramate (TOPAMAX) 50 MG tablet; Take 1 tablet by mouth 2 (Two) Times a Day.  Dispense: 180 tablet; Refill: 1    6. Primary osteoarthritis, unspecified site  -     meloxicam (Mobic) 7.5 MG tablet; Take 1 tablet by mouth Daily.  Dispense: 90 tablet; Refill: 1    7. High risk medication use  -     omeprazole (priLOSEC) 20 MG capsule; Take 1 capsule by mouth  Daily. When on mobic  Dispense: 90 capsule; Refill: 1  -     CBC Auto Differential; Future  -     Comprehensive Metabolic Panel; Future    8. Primary insomnia  -     zolpidem (AMBIEN) 10 MG tablet; Take 1 tablet by mouth At Night As Needed for Sleep.  Dispense: 30 tablet; Refill: 2    9. Hyperlipidemia, unspecified hyperlipidemia type  -     Lipid Panel; Future         Annual physical completed    Regular office visit completed as well    She is can be going to [place] needs motion sickness medicine so Transderm scopolamine given and I instruct her how to use that properly and how to dispose of it properly etc. not to touch her eyes with that etc.    Refilled her meloxicam we discussed about the risk benefits side effects including GI bleed kidney damage heart attack stroke she is agrees to proceed she will take Prilosec with this medicine as it really does help her arthritis and she has been taking it daily the Prilosec should help reduce her risk of GI bleed    Refilled her Ambien and Xanax as directed    She did sign the consent form [place] reviewed UDS up-to-date    Labs reviewed with her chloride was little elevated but I suspect this is lab error as we have seen a little trend lately with our patients with elevated chlorides    She elected to just repeat that lab test in 6 months.    Follow-up with gynecology and other specialist as directed  BMI is >= 25 and <30. (Overweight) The following options were offered after discussion;: exercise counseling/recommendations and nutrition counseling/recommendations      Follow Up:   Return in about 6 months (around 10/30/2024) for Recheck.        Alfredito Rocha MD  [facility]   Portions of note created with Dragon voice recognition technology

## 2024-04-30 NOTE — LETTER
*UDS UTD*  Controlled Substance Prescribing Agreement          I, Tracey Shukla [PATIENT],  1965 [] a patient of  Alfredito Rocha MD   [PROVIDER] at [facility] [PRACTICE], have been informed that  individuals who are prescribed certain Controlled Substances including, but not limited to, narcotic pain medicines, stimulants, benzodiazepine tranquilizers, and barbiturate sedatives, can abuse those substances or may allow abuse by others, and have some risk of developing an addictive disorder or suffering a relapse of a prior addiction. Therefore, I have been informed that it is necessary to observe strict rules pertaining to their use, and I agree to follow the terms and procedures described in this Agreement as consideration for, and as a condition of, the willingness of the physician whose signature appears below to consider prescribing or to continue prescribing Controlled Substances to treat my pain.     1. I will inform my physician of any current or past substance abuse, or any current or past substance abuse of any immediate member of my immediate family.     2. I agree that I may be subject to a voluntary evaluation by psychologists and/or psychiatrists, possibly at my own expense, before any Controlled Substances will be prescribed to me. I agree that the need to be evaluated by psychologists and/or psychiatrists may be revisited every three (3) to six (6) months thereafter while taking the medication.     3. All Controlled Substances must come from a provider in the PROVIDER’S PRACTICE. My Controlled Substances will come from the PROVIDER whose signature appears below, or during his or her absence, by the covering provider, unless specific written authorization is obtained from the office for an exception.     4. I will obtain all Controlled Substances from the same pharmacy. Should the need arise to change pharmacies, I will inform the PROVIDER’S office.     5. I will  inform the PROVIDER’S office of any new medications or medical conditions, and of any adverse effects I experience from any of the medications that I take.     6. I will inform my other health care providers that I am taking the Controlled Substances listed above, and of the existence of this Agreement. In the event of an emergency, I will provide the foregoing information to emergency department providers.     7. I agree that my prescribing PROVIDER has permission to discuss all diagnostic and treatment details with other health care providers, pharmacists, or other professionals who provide my health care regarding my use of Controlled Substances for purposes of maintaining accountability.     8. I will not allow anyone else to have, use sell, or otherwise have access to these medications. The sharing of medications with anyone is absolutely forbidden and is against the law.         9. I understand that Controlled Substances may be hazardous or lethal to a person who is not tolerant to their effects, especially a child, and that I must keep them out of reach of such people for their own safety.     10. I understand that tampering with a written prescription is a felony and I will not change or tamper with the PROVIDER’S written prescription.     11. I am aware that attempting to obtain a Controlled Substance under false pretenses is illegal.     12. I agree not to alter my medication in any way, and I will take my medication whole, and it will not be broken, chewed, crushed, injected, or snorted.     13. I will take my medication as instructed and prescribed, and I will not exceed the maximum prescribed dose. Any change in dosage must be approved by the PROVIDER or a physician within the PRACTICE.     14. I understand that these drugs should not be stopped abruptly, as withdrawal syndromes may develop.     15. I will cooperate with unannounced urine or serum toxicology screenings as may be requested, as well as  any random pill counts of medication by the PROVIDER. Failure to comply may result in termination of the PROVIDER-patient relationship.     16. I understand that the presence of unauthorized and/or illegal substances in the screenings described in the paragraph above may prompt referral for assessment for a substance abuse disorder or termination of the PROVIDER-patient relationship.     17. I understand that medications may not be replaced if they are lost, damaged, or stolen. If any of these situations arise that cause me to request an early refill of my medication, a copy of a filed police report or a statement from me explaining the circumstances may be required before additional prescriptions are considered. If I request an early refill secondary to lost, damaged, or stolen prescriptions twice within a year, I may be discharged from the practice.     18. I understand that a prescription may be given early if the PROVIDER or the patient will be out of town when the refill is due. These prescriptions will contain instructions to the pharmacist that the prescriptions(s) may not be filled prior to the appropriate date.     19. If the responsible legal authorities have questions concerning my treatment, as may occur, for example, if I obtained medication at several pharmacies, all confidentiality is waived, and these authorities may be given full access to my full records of Controlled Substances administration.     20. I will keep my scheduled appointments in order to receive medication renewals. If I need to cancel my appointment, I will do so a minimum of twenty-four (24) hours before it is scheduled.     21. I understand that I may be asked to bring my medications in their original container to the PROVIDER’s office while I am on controlled medication.     22. Refills generally will not be given over the phone, after office hours, during the weekends, and on holidays.     23. I understand that any medical  treatment is initially a trial, with the goal of treatment being to improve the quality of life and ability to function and/or work. These parameters will be assessed periodically to determine the benefits of continued therapy, and continued prescription is contingent on whether my physician believes that the medication usage benefits me. I will comply with all treatments as outlined by the PROVIDER.     24. I have been explained the risks and potential benefits of these therapies, including, but not limited to, psychological addiction, physical dependence, withdrawal and over dosage.     25. I understand that failure to adhere to these policies and/or failure to comply with the PROVIDER’S treatment plan may result in cessation of therapy with Controlled Substance prescribing by the PROVIDER or referral for further specialty assessment, as well as possible discharge from the PRACTICE.     26. I, the undersigned patient, attest that the foregoing was discussed with me, and that I have read, fully understand, and agree to all of the above requirements and instructions. I affirm that I have the full right and power to sign and be bound by this  Agreement.         Date:  4/30/2024    Patient Printed Name:  Tracey Shukla 1965    Patient Signature:  ________________________________           Date:  4/30/2024    Provider Signature:  _______________________________

## 2024-06-28 DIAGNOSIS — F41.9 ANXIETY: ICD-10-CM

## 2024-06-28 RX ORDER — ALPRAZOLAM 0.5 MG/1
0.5 TABLET ORAL 3 TIMES DAILY PRN
Qty: 60 TABLET | Refills: 0 | Status: SHIPPED | OUTPATIENT
Start: 2024-06-28

## 2024-07-22 DIAGNOSIS — N95.0 POST-MENOPAUSAL BLEEDING: Primary | ICD-10-CM

## 2024-07-27 ENCOUNTER — TELEPHONE (OUTPATIENT)
Dept: OBSTETRICS AND GYNECOLOGY | Facility: CLINIC | Age: 59
End: 2024-07-27
Payer: COMMERCIAL

## 2024-07-27 NOTE — TELEPHONE ENCOUNTER
Let her know the ultrasound was normal.  No additional testing is needed unless the bleeding continues

## 2024-07-29 NOTE — TELEPHONE ENCOUNTER
Called and spoke with patient, informed them of test results and physician advisement in full.  Patient verified understanding and had no further questions at this time.

## 2024-07-31 ENCOUNTER — TELEPHONE (OUTPATIENT)
Dept: FAMILY MEDICINE CLINIC | Facility: CLINIC | Age: 59
End: 2024-07-31

## 2024-07-31 NOTE — TELEPHONE ENCOUNTER
CALLED PT IN REGARDS TO HER WANTING TO BE SCHEDULED FOR HER DIVERTICULITIS FLARE UP. I ADVISED THAT UNFORTUNATELY DR NIEVES DID NOT HAVE ANY OPENINGS TOMORROW BUT I COULD GET HER IN WITH [facility] FU TOMORROW AFTERNOON. SHE STATED SHE ABSOLUTELY WOULD NOT SEE HER. SHE STATED SHE IS HER LEAST FAVORITE PROVIDER AND THERE IS A REASON THAT SHE HAS OPENINGS BECAUSE NO ONE WANTS TO SEE HER. SHE STATED SHE'S HAD PROVIDERS EVEN VOICE THEIR OPINION ON HER. SHE STATED SHE WOULD SEE SOMEONE ELSE IN THE OFFICE THAT WASN'T JULIO BUT SHE FINDS IT DISHEARTENING THAT DR NIEVES IS TRYING TO PAWN HER OFF ANOTHER PROVIDER AND THE WORST ONE AT THAT WHEN HE'S HER FAMILY DOCTOR AND KNOWS ALL THE HISTORY. I APOLOGIZED AND TOLD HER I WAS SORRY SHE HAD THAT EXPERIENCE BUT DR NIEVES WASN'T TRYING TO PAWN HER OFF. HE WAS JUST COMPLETLEY BOOKED AND SHE HAD AN OPENING. I ADVISED THAT IF SHE DIDN'T THINK SHE COULD WAIT TO SEE DR THORPE NEXT WEEK THEN SHE COULD CALL BACK TOMORROW FOR A SAME DAY OR GO TO [facility] OR THE ER. SHE STATED SHE WENT TO [facility] LA[facility] TIME AND THEY GAVE HER SINUS MEDICINE AND ENDED UP CAUSING MORE PROBLEMS. SHE STATED SHE WILL TRY TO CALL BACK TOMORROW BUT SHE WILL BE LEAVING DR NIEVES A MESSAGE TO LOOK AT. PLEASE ADVISE ON WHAT WE NEED TO DO.

## 2024-07-31 NOTE — TELEPHONE ENCOUNTER
"Caller: Tracey Shukla \"Tracey Villanueva\"    Relationship to patient: Self    Best call back number: [phone]     Patient is needing: PATIENT REQUESTED A MESSAGE BE SENT TO INFORM HER PROVIDER THAT SHE CALLED AND TRIED TO MAKE AN APPOINTMENT IN REGARDS TO HER DIVERTICULITIS FLARE UP [place] NOTHING WAS AVAILABLE.     PLEASE SEE HER [employer] MESSAGE FROM 7.30.24.   "

## 2024-08-26 DIAGNOSIS — F41.9 ANXIETY: ICD-10-CM

## 2024-08-26 RX ORDER — IBUPROFEN 800 MG/1
800 TABLET, FILM COATED ORAL EVERY 8 HOURS PRN
Qty: 90 TABLET | Refills: 0 | Status: SHIPPED | OUTPATIENT
Start: 2024-08-26

## 2024-08-26 RX ORDER — ALPRAZOLAM 0.5 MG
0.5 TABLET ORAL 3 TIMES DAILY PRN
Qty: 60 TABLET | Refills: 0 | Status: SHIPPED | OUTPATIENT
Start: 2024-08-26

## 2024-09-21 DIAGNOSIS — M62.838 MUSCLE SPASM: ICD-10-CM

## 2024-09-23 RX ORDER — CYCLOBENZAPRINE HCL 10 MG
10 TABLET ORAL NIGHTLY PRN
Qty: 30 TABLET | Refills: 0 | Status: SHIPPED | OUTPATIENT
Start: 2024-09-23

## 2024-10-24 ENCOUNTER — LAB (OUTPATIENT)
Dept: FAMILY MEDICINE CLINIC | Facility: CLINIC | Age: 59
End: 2024-10-24
Payer: COMMERCIAL

## 2024-10-24 DIAGNOSIS — Z79.899 HIGH RISK MEDICATION USE: ICD-10-CM

## 2024-10-24 DIAGNOSIS — E78.5 HYPERLIPIDEMIA, UNSPECIFIED HYPERLIPIDEMIA TYPE: ICD-10-CM

## 2024-10-25 LAB
ALBUMIN SERPL-MCNC: 4.1 G/DL (ref 3.8–4.9)
ALP SERPL-CCNC: 64 IU/L (ref 44–121)
ALT SERPL-CCNC: 13 IU/L (ref 0–32)
AST SERPL-CCNC: 15 IU/L (ref 0–40)
BASOPHILS # BLD AUTO: 0.1 X10E3/UL (ref 0–0.2)
BASOPHILS NFR BLD AUTO: 1 %
BILIRUB SERPL-MCNC: 0.6 MG/DL (ref 0–1.2)
BUN SERPL-MCNC: 15 MG/DL (ref 6–24)
BUN/CREAT SERPL: 18 (ref 9–23)
CALCIUM SERPL-MCNC: 9.2 MG/DL (ref 8.7–10.2)
CHLORIDE SERPL-SCNC: 109 MMOL/L (ref 96–106)
CHOLEST SERPL-MCNC: 217 MG/DL (ref 100–199)
CO2 SERPL-SCNC: 22 MMOL/L (ref 20–29)
CREAT SERPL-MCNC: 0.84 MG/DL (ref 0.57–1)
EGFRCR SERPLBLD CKD-EPI 2021: 80 ML/MIN/1.73
EOSINOPHIL # BLD AUTO: 0.1 X10E3/UL (ref 0–0.4)
EOSINOPHIL NFR BLD AUTO: 3 %
ERYTHROCYTE [DISTWIDTH] IN BLOOD BY AUTOMATED COUNT: 12.1 % (ref 11.7–15.4)
GLOBULIN SER CALC-MCNC: 2.3 G/DL (ref 1.5–4.5)
GLUCOSE SERPL-MCNC: 99 MG/DL (ref 70–99)
HCT VFR BLD AUTO: 42.4 % (ref 34–46.6)
HDLC SERPL-MCNC: 89 MG/DL
HGB BLD-MCNC: 14.1 G/DL (ref 11.1–15.9)
IMM GRANULOCYTES # BLD AUTO: 0 X10E3/UL (ref 0–0.1)
IMM GRANULOCYTES NFR BLD AUTO: 1 %
LDLC SERPL CALC-MCNC: 118 MG/DL (ref 0–99)
LYMPHOCYTES # BLD AUTO: 1.6 X10E3/UL (ref 0.7–3.1)
LYMPHOCYTES NFR BLD AUTO: 37 %
MCH RBC QN AUTO: 31 PG (ref 26.6–33)
MCHC RBC AUTO-ENTMCNC: 33.3 G/DL (ref 31.5–35.7)
MCV RBC AUTO: 93 FL (ref 79–97)
MONOCYTES # BLD AUTO: 0.3 X10E3/UL (ref 0.1–0.9)
MONOCYTES NFR BLD AUTO: 7 %
NEUTROPHILS # BLD AUTO: 2.3 X10E3/UL (ref 1.4–7)
NEUTROPHILS NFR BLD AUTO: 51 %
PLATELET # BLD AUTO: 258 X10E3/UL (ref 150–450)
POTASSIUM SERPL-SCNC: 4.4 MMOL/L (ref 3.5–5.2)
PROT SERPL-MCNC: 6.4 G/DL (ref 6–8.5)
RBC # BLD AUTO: 4.55 X10E6/UL (ref 3.77–5.28)
SODIUM SERPL-SCNC: 143 MMOL/L (ref 134–144)
TRIGL SERPL-MCNC: 59 MG/DL (ref 0–149)
VLDLC SERPL CALC-MCNC: 10 MG/DL (ref 5–40)
WBC # BLD AUTO: 4.4 X10E3/UL (ref 3.4–10.8)

## 2024-10-30 RX ORDER — MELOXICAM 7.5 MG/1
7.5 TABLET ORAL DAILY
Qty: 90 TABLET | Refills: 1 | Status: CANCELLED | OUTPATIENT
Start: 2024-10-30

## 2024-10-31 ENCOUNTER — OFFICE VISIT (OUTPATIENT)
Dept: FAMILY MEDICINE CLINIC | Facility: CLINIC | Age: 59
End: 2024-10-31
Payer: COMMERCIAL

## 2024-10-31 VITALS
OXYGEN SATURATION: 98 % | DIASTOLIC BLOOD PRESSURE: 68 MMHG | SYSTOLIC BLOOD PRESSURE: 120 MMHG | BODY MASS INDEX: 27.06 KG/M2 | HEIGHT: 70 IN | WEIGHT: 189 LBS | HEART RATE: 73 BPM

## 2024-10-31 DIAGNOSIS — M54.41 ACUTE RIGHT-SIDED LOW BACK PAIN WITH RIGHT-SIDED SCIATICA: ICD-10-CM

## 2024-10-31 DIAGNOSIS — F41.9 ANXIETY: ICD-10-CM

## 2024-10-31 DIAGNOSIS — G43.809 OTHER MIGRAINE WITHOUT STATUS MIGRAINOSUS, NOT INTRACTABLE: ICD-10-CM

## 2024-10-31 DIAGNOSIS — Z79.899 HIGH RISK MEDICATION USE: ICD-10-CM

## 2024-10-31 DIAGNOSIS — R06.83 SNORING: ICD-10-CM

## 2024-10-31 DIAGNOSIS — F51.01 PRIMARY INSOMNIA: ICD-10-CM

## 2024-10-31 DIAGNOSIS — E78.2 MIXED HYPERLIPIDEMIA: Primary | ICD-10-CM

## 2024-10-31 DIAGNOSIS — M19.91 PRIMARY OSTEOARTHRITIS, UNSPECIFIED SITE: ICD-10-CM

## 2024-10-31 LAB
POC AMPHETAMINES: NEGATIVE
POC BARBITURATES: NEGATIVE
POC BENZODIAZEPHINES: POSITIVE
POC COCAINE: NEGATIVE
POC METHADONE: NEGATIVE
POC METHAMPHETAMINE SCREEN URINE: NEGATIVE
POC OPIATES: NEGATIVE
POC OXYCODONE: NEGATIVE
POC PHENCYCLIDINE: NEGATIVE
POC PROPOXYPHENE: NEGATIVE
POC THC: NEGATIVE
POC TRICYCLIC ANTIDEPRESSANTS: NEGATIVE

## 2024-10-31 PROCEDURE — 80305 DRUG TEST PRSMV DIR OPT OBS: CPT | Performed by: FAMILY MEDICINE

## 2024-10-31 PROCEDURE — 99214 OFFICE O/P EST MOD 30 MIN: CPT | Performed by: FAMILY MEDICINE

## 2024-10-31 RX ORDER — ZOLPIDEM TARTRATE 10 MG/1
10 TABLET ORAL NIGHTLY PRN
Qty: 30 TABLET | Refills: 2 | Status: SHIPPED | OUTPATIENT
Start: 2024-10-31

## 2024-10-31 RX ORDER — ALPRAZOLAM 0.5 MG
0.5 TABLET ORAL 3 TIMES DAILY PRN
Qty: 60 TABLET | Refills: 0 | Status: SHIPPED | OUTPATIENT
Start: 2024-10-31

## 2024-10-31 RX ORDER — TOPIRAMATE 50 MG/1
50 TABLET, FILM COATED ORAL 2 TIMES DAILY
Qty: 180 TABLET | Refills: 1 | Status: SHIPPED | OUTPATIENT
Start: 2024-10-31

## 2024-10-31 NOTE — PATIENT INSTRUCTIONS
Health Maintenance, Female  Adopting a healthy lifestyle and getting preventive care can go a long way to promote health and wellness. Talk with your health care provider about what schedule of regular examinations is right for you. This is a good chance for you to check in with your provider about disease prevention and staying healthy.  In between checkups, there are plenty of things you can do on your own. Experts have done a lot of research about which lifestyle changes and preventive measures are most likely to keep you healthy. Ask your health care provider for more information.  Weight and diet  Eat a healthy diet  Be sure to include plenty of vegetables, fruits, low-fat dairy products, and lean protein.  Do not eat a lot of foods high in solid fats, added sugars, or salt.  Get regular exercise. This is one of the most important things you can do for your health.  Most adults should exercise for at least 150 minutes each week. The exercise should increase your heart rate and make you sweat (moderate-intensity exercise).  Most adults should also do strengthening exercises at least twice a week. This is in addition to the moderate-intensity exercise.     Maintain a healthy weight  Body mass index (BMI) is a measurement that can be used to identify possible weight problems. It estimates body fat based on height and weight. Your health care provider can help determine your BMI and help you achieve or maintain a healthy weight.  For females 20 years of age and older:  A BMI below 18.5 is considered underweight.  A BMI of 18.5 to 24.9 is normal.  A BMI of 25 to 29.9 is considered overweight.  A BMI of 30 and above is considered obese.     Watch levels of cholesterol and blood lipids  You should start having your blood tested for lipids and cholesterol at 20 years of age, then have this test every 5 years.  You may need to have your cholesterol levels checked more often if:  Your lipid or cholesterol levels are  high.  You are older than 50 years of age.  You are at high risk for heart disease.     Cancer screening  Lung Cancer  Lung cancer screening is recommended for adults 55-80 years old who are at high risk for lung cancer because of a history of smoking.  A yearly low-dose CT scan of the lungs is recommended for people who:  Currently smoke.  Have quit within the past 15 years.  Have at least a 30-pack-year history of smoking. A pack year is smoking an average of one pack of cigarettes a day for 1 year.  Yearly screening should continue until it has been 15 years since you quit.  Yearly screening should stop if you develop a health problem that would prevent you from having lung cancer treatment.     Breast Cancer  Practice breast self-awareness. This means understanding how your breasts normally appear and feel.  It also means doing regular breast self-exams. Let your health care provider know about any changes, no matter how small.  If you are in your 20s or 30s, you should have a clinical breast exam (CBE) by a health care provider every 1-3 years as part of a regular health exam.  If you are 40 or older, have a CBE every year. Also consider having a breast X-ray (mammogram) every year.  If you have a family history of breast cancer, talk to your health care provider about genetic screening.  If you are at high risk for breast cancer, talk to your health care provider about having an MRI and a mammogram every year.  Breast cancer gene (BRCA) assessment is recommended for women who have family members with BRCA-related cancers. BRCA-related cancers include:  Breast.  Ovarian.  Tubal.  Peritoneal cancers.  Results of the assessment will determine the need for genetic counseling and BRCA1 and BRCA2 testing.     Cervical Cancer  Your health care provider may recommend that you be screened regularly for cancer of the pelvic organs (ovaries, uterus, and vagina). This screening involves a pelvic examination, including  checking for microscopic changes to the surface of your cervix (Pap test). You may be encouraged to have this screening done every 3 years, beginning at age 21.  For women ages 30-65, health care providers may recommend pelvic exams and Pap testing every 3 years, or they may recommend the Pap and pelvic exam, combined with testing for human papilloma virus (HPV), every 5 years. Some types of HPV increase your risk of cervical cancer. Testing for HPV may also be done on women of any age with unclear Pap test results.  Other health care providers may not recommend any screening for nonpregnant women who are considered low risk for pelvic cancer and who do not have symptoms. Ask your health care provider if a screening pelvic exam is right for you.  If you have had past treatment for cervical cancer or a condition that could lead to cancer, you need Pap tests and screening for cancer for at least 20 years after your treatment. If Pap tests have been discontinued, your risk factors (such as having a new sexual partner) need to be reassessed to determine if screening should resume. Some women have medical problems that increase the chance of getting cervical cancer. In these cases, your health care provider may recommend more frequent screening and Pap tests.     Colorectal Cancer  This type of cancer can be detected and often prevented.  Routine colorectal cancer screening usually begins at 50 years of age and continues through 75 years of age.  Your health care provider may recommend screening at an earlier age if you have risk factors for colon cancer.  Your health care provider may also recommend using home test kits to check for hidden blood in the stool.  A small camera at the end of a tube can be used to examine your colon directly (sigmoidoscopy or colonoscopy). This is done to check for the earliest forms of colorectal cancer.  Routine screening usually begins at age 50.  Direct examination of the colon should  be repeated every 5-10 years through 75 years of age. However, you may need to be screened more often if early forms of precancerous polyps or small growths are found.     Skin Cancer  Check your skin from head to toe regularly.  Tell your health care provider about any new moles or changes in moles, especially if there is a change in a mole's shape or color.  Also tell your health care provider if you have a mole that is larger than the size of a pencil eraser.  Always use sunscreen. Apply sunscreen liberally and repeatedly throughout the day.  Protect yourself by wearing long sleeves, pants, a wide-brimmed hat, and sunglasses whenever you are outside.     Heart disease, diabetes, and high blood pressure  High blood pressure causes heart disease and increases the risk of stroke. High blood pressure is more likely to develop in:  People who have blood pressure in the high end of the normal range (130-139/85-89 mm Hg).  People who are overweight or obese.  People who are .  If you are 18-39 years of age, have your blood pressure checked every 3-5 years. If you are 40 years of age or older, have your blood pressure checked every year. You should have your blood pressure measured twice--once when you are at a hospital or clinic, and once when you are not at a hospital or clinic. Record the average of the two measurements. To check your blood pressure when you are not at a hospital or clinic, you can use:  An automated blood pressure machine at a pharmacy.  A home blood pressure monitor.  If you are between 55 years and 79 years old, ask your health care provider if you should take aspirin to prevent strokes.  Have regular diabetes screenings. This involves taking a blood sample to check your fasting blood sugar level.  If you are at a normal weight and have a low risk for diabetes, have this test once every three years after 45 years of age.  If you are overweight and have a high risk for diabetes,  consider being tested at a younger age or more often.  Preventing infection  Hepatitis B  If you have a higher risk for hepatitis B, you should be screened for this virus. You are considered at high risk for hepatitis B if:  You were born in a country where hepatitis B is common. Ask your health care provider which countries are considered high risk.  Your parents were born in a high-risk country, and you have not been immunized against hepatitis B (hepatitis B vaccine).  You have HIV or AIDS.  You use needles to inject street drugs.  You live with someone who has hepatitis B.  You have had sex with someone who has hepatitis B.  You get hemodialysis treatment.  You take certain medicines for conditions, including cancer, organ transplantation, and autoimmune conditions.     Hepatitis C  Blood testing is recommended for:  Everyone born from 1945 through 1965.  Anyone with known risk factors for hepatitis C.     Sexually transmitted infections (STIs)  You should be screened for sexually transmitted infections (STIs) including gonorrhea and chlamydia if:  You are sexually active and are younger than 24 years of age.  You are older than 24 years of age and your health care provider tells you that you are at risk for this type of infection.  Your sexual activity has changed since you were last screened and you are at an increased risk for chlamydia or gonorrhea. Ask your health care provider if you are at risk.  If you do not have HIV, but are at risk, it may be recommended that you take a prescription medicine daily to prevent HIV infection. This is called pre-exposure prophylaxis (PrEP). You are considered at risk if:  You are sexually active and do not regularly use condoms or know the HIV status of your partner(s).  You take drugs by injection.  You are sexually active with a partner who has HIV.     Talk with your health care provider about whether you are at high risk of being infected with HIV. If you choose to  begin PrEP, you should first be tested for HIV. You should then be tested every 3 months for as long as you are taking PrEP.  Pregnancy  If you are premenopausal and you may become pregnant, ask your health care provider about preconception counseling.  If you may become pregnant, take 400 to 800 micrograms (mcg) of folic acid every day.  If you want to prevent pregnancy, talk to your health care provider about birth control (contraception).  Osteoporosis and menopause  Osteoporosis is a disease in which the bones lose minerals and strength with aging. This can result in serious bone fractures. Your risk for osteoporosis can be identified using a bone density scan.  If you are 65 years of age or older, or if you are at risk for osteoporosis and fractures, ask your health care provider if you should be screened.  Ask your health care provider whether you should take a calcium or vitamin D supplement to lower your risk for osteoporosis.  Menopause may have certain physical symptoms and risks.  Hormone replacement therapy may reduce some of these symptoms and risks.  Talk to your health care provider about whether hormone replacement therapy is right for you.  Follow these instructions at home:  Schedule regular health, dental, and eye exams.  Stay current with your immunizations.  Do not use any tobacco products including cigarettes, chewing tobacco, or electronic cigarettes.  If you are pregnant, do not drink alcohol.  If you are breastfeeding, limit how much and how often you drink alcohol.  Limit alcohol intake to no more than 1 drink per day for nonpregnant women. One drink equals 12 ounces of beer, 5 ounces of wine, or 1½ ounces of hard liquor.  Do not use street drugs.  Do not share needles.  Ask your health care provider for help if you need support or information about quitting drugs.  Tell your health care provider if you often feel depressed.  Tell your health care provider if you have ever been abused or do  not feel safe at home.  This information is not intended to replace advice given to you by your health care provider. Make sure you discuss any questions you have with your health care provider.  Document Released: 07/02/2012 Document Revised: 05/25/2017 Document Reviewed: 09/20/2016  [employer] Interactive Patient Education © 2018 [employer]

## 2024-10-31 NOTE — LETTER
*UDS DUE*  Controlled Substance Prescribing Agreement          I, Tracey Shukla [PATIENT],  1965 [] a patient of  Alfredito Rocha MD   [PROVIDER] at [facility] [PRACTICE], have been informed that  individuals who are prescribed certain Controlled Substances including, but not limited to, narcotic pain medicines, stimulants, benzodiazepine tranquilizers, and barbiturate sedatives, can abuse those substances or may allow abuse by others, and have some risk of developing an addictive disorder or suffering a relapse of a prior addiction. Therefore, I have been informed that it is necessary to observe strict rules pertaining to their use, and I agree to follow the terms and procedures described in this Agreement as consideration for, and as a condition of, the willingness of the physician whose signature appears below to consider prescribing or to continue prescribing Controlled Substances to treat my pain.     1. I will inform my physician of any current or past substance abuse, or any current or past substance abuse of any immediate member of my immediate family.     2. I agree that I may be subject to a voluntary evaluation by psychologists and/or psychiatrists, possibly at my own expense, before any Controlled Substances will be prescribed to me. I agree that the need to be evaluated by psychologists and/or psychiatrists may be revisited every three (3) to six (6) months thereafter while taking the medication.     3. All Controlled Substances must come from a provider in the PROVIDER’S PRACTICE. My Controlled Substances will come from the PROVIDER whose signature appears below, or during his or her absence, by the covering provider, unless specific written authorization is obtained from the office for an exception.     4. I will obtain all Controlled Substances from the same pharmacy. Should the need arise to change pharmacies, I will inform the PROVIDER’S office.     5. I will  inform the PROVIDER’S office of any new medications or medical conditions, and of any adverse effects I experience from any of the medications that I take.     6. I will inform my other health care providers that I am taking the Controlled Substances listed above, and of the existence of this Agreement. In the event of an emergency, I will provide the foregoing information to emergency department providers.     7. I agree that my prescribing PROVIDER has permission to discuss all diagnostic and treatment details with other health care providers, pharmacists, or other professionals who provide my health care regarding my use of Controlled Substances for purposes of maintaining accountability.     8. I will not allow anyone else to have, use sell, or otherwise have access to these medications. The sharing of medications with anyone is absolutely forbidden and is against the law.         9. I understand that Controlled Substances may be hazardous or lethal to a person who is not tolerant to their effects, especially a child, and that I must keep them out of reach of such people for their own safety.     10. I understand that tampering with a written prescription is a felony and I will not change or tamper with the PROVIDER’S written prescription.     11. I am aware that attempting to obtain a Controlled Substance under false pretenses is illegal.     12. I agree not to alter my medication in any way, and I will take my medication whole, and it will not be broken, chewed, crushed, injected, or snorted.     13. I will take my medication as instructed and prescribed, and I will not exceed the maximum prescribed dose. Any change in dosage must be approved by the PROVIDER or a physician within the PRACTICE.     14. I understand that these drugs should not be stopped abruptly, as withdrawal syndromes may develop.     15. I will cooperate with unannounced urine or serum toxicology screenings as may be requested, as well as  any random pill counts of medication by the PROVIDER. Failure to comply may result in termination of the PROVIDER-patient relationship.     16. I understand that the presence of unauthorized and/or illegal substances in the screenings described in the paragraph above may prompt referral for assessment for a substance abuse disorder or termination of the PROVIDER-patient relationship.     17. I understand that medications may not be replaced if they are lost, damaged, or stolen. If any of these situations arise that cause me to request an early refill of my medication, a copy of a filed police report or a statement from me explaining the circumstances may be required before additional prescriptions are considered. If I request an early refill secondary to lost, damaged, or stolen prescriptions twice within a year, I may be discharged from the practice.     18. I understand that a prescription may be given early if the PROVIDER or the patient will be out of town when the refill is due. These prescriptions will contain instructions to the pharmacist that the prescriptions(s) may not be filled prior to the appropriate date.     19. If the responsible legal authorities have questions concerning my treatment, as may occur, for example, if I obtained medication at several pharmacies, all confidentiality is waived, and these authorities may be given full access to my full records of Controlled Substances administration.     20. I will keep my scheduled appointments in order to receive medication renewals. If I need to cancel my appointment, I will do so a minimum of twenty-four (24) hours before it is scheduled.     21. I understand that I may be asked to bring my medications in their original container to the PROVIDER’s office while I am on controlled medication.     22. Refills generally will not be given over the phone, after office hours, during the weekends, and on holidays.     23. I understand that any medical  treatment is initially a trial, with the goal of treatment being to improve the quality of life and ability to function and/or work. These parameters will be assessed periodically to determine the benefits of continued therapy, and continued prescription is contingent on whether my physician believes that the medication usage benefits me. I will comply with all treatments as outlined by the PROVIDER.     24. I have been explained the risks and potential benefits of these therapies, including, but not limited to, psychological addiction, physical dependence, withdrawal and over dosage.     25. I understand that failure to adhere to these policies and/or failure to comply with the PROVIDER’S treatment plan may result in cessation of therapy with Controlled Substance prescribing by the PROVIDER or referral for further specialty assessment, as well as possible discharge from the PRACTICE.     26. I, the undersigned patient, attest that the foregoing was discussed with me, and that I have read, fully understand, and agree to all of the above requirements and instructions. I affirm that I have the full right and power to sign and be bound by this  Agreement.         Date:  10/31/2024    Patient Printed Name:  Tracey Villanueva Gayla 1965    Patient Signature:  ________________________________           Date:  10/31/2024    Provider Printed Name: Alfredito Rocha MD    Provider Signature:  _______________________________

## 2024-10-31 NOTE — PROGRESS NOTES
Follow Up Office Visit      Patient Name: Tracey Shukla  : 1965   MRN: [id number]     Chief Complaint:    Chief Complaint   Patient presents with   • Follow-up       History of Present Illness: Tracey Shukla is a 59 y.o. female who is here today to   follow-up on her chronic medical problems and to go over blood work.  Her lipids did show slight increase in her cholesterol and LDL.  She is doing well getting over the stress of her daughters recent divorce and is happy that her daughter is moved back home and working here in [place] and doing better    Says she also snores and would like a sleep study she is not sure if she has or any sleep apnea or not.    Review of Systems   Constitutional: Negative for fatigue and fever.   Respiratory: Negative for cough and shortness of breath.    Cardiovascular: Negative for chest pain and palpitations.   Skin: Negative for rash or itching      Subjective      Review of Systems:   Review of Systems    Past Medical History:   Past Medical History:   Diagnosis Date   • Anxiety - PCP manages    • Diverticulosis     Seen incidentally at colonoscopy   • Frozen shoulder - right    • Goiter    • H/O migraine    • Insomnia    • Rib fractures - kicked by a race horse 2023       Past Surgical History:   Past Surgical History:   Procedure Laterality Date   • CLOSED MANIPULATION SHOULDER Right 2017   • COLONOSCOPY  2015    10 YEARS   • INCISIONAL [place]IA [place]      [place] [facility] [place]   • LAPAROSCOPIC SALPINGOOPHERECTOMY Left     Enlarged ovary - benign (Dr. Mir López)   • THYROID SURGERY      Goiter removed       Family History:   Family History   Problem Relation Age of Onset   • Depression Father    • Colon cancer Father 88   • Lung cancer Mother        Social History:   Social History     Socioeconomic History   • Marital status:    Tobacco Use   • Smoking status: Never     Passive exposure: Never   • Smokeless tobacco: Never    Vaping Use   • Vaping status: Never Used   Substance and Sexual Activity   • Alcohol use: Yes     Alcohol/week: 7.0 standard drinks of alcohol     Types: 7 Glasses of wine per week   • Drug use: Never   • Sexual activity: Yes     Partners: Male     Birth control/protection: Condom, Partner of same sex       Medications:     Current Outpatient Medications:   •  ALPRAZolam (XANAX) 0.5 MG tablet, Take 1 tablet by mouth 3 (Three) Times a Day As Needed for Anxiety., Disp: 60 tablet, Rfl: 0  •  topiramate (TOPAMAX) 50 MG tablet, Take 1 tablet by mouth 2 (Two) Times a Day., Disp: 180 tablet, Rfl: 1  •  zolpidem (AMBIEN) 10 MG tablet, Take 1 tablet by mouth At Night As Needed for Sleep., Disp: 30 tablet, Rfl: 2  •  azelastine (ASTEPRO) 0.15 % solution nasal spray, 2 sprays into the nostril(s) as directed by provider Daily As Needed for Rhinitis., Disp: 23 mL, Rfl: 5  •  cyclobenzaprine (FLEXERIL) 10 MG tablet, Take 1 tablet by mouth At Night As Needed for Muscle Spasms., Disp: 30 tablet, Rfl: 0  •  estradiol (Estrace) 0.5 MG tablet, Take 1 tablet by mouth Daily., Disp: 90 tablet, Rfl: 4  •  galcanezumab-gnlm (Emgality) 120 MG/ML auto-injector pen, Inject  under the skin into the appropriate area as directed., Disp: , Rfl:   •  ibuprofen (ADVIL,MOTRIN) 800 MG tablet, Take 1 tablet by mouth Every 8 (Eight) Hours As Needed for Mild Pain., Disp: 90 tablet, Rfl: 0  •  medroxyPROGESTERone (Provera) 2.5 MG tablet, Take 1 tablet by mouth Daily., Disp: 90 tablet, Rfl: 4  •  meloxicam (Mobic) 7.5 MG tablet, Take 1 tablet by mouth Daily., Disp: 90 tablet, Rfl: 1  •  omeprazole (priLOSEC) 20 MG capsule, Take 1 capsule by mouth Daily. When on mobic, Disp: 90 capsule, Rfl: 1  •  rizatriptan (MAXALT) 10 MG tablet, rizatriptan 10 mg tablet, Disp: , Rfl:   •  sertraline (ZOLOFT) 50 MG tablet, Take 1 tablet by mouth Daily., Disp: 90 tablet, Rfl: 3    Allergies:   No Known Allergies    Objective     Physical Exam:  Vital Signs:   Vitals:     "10/31/24 0934   BP: 120/68   BP Location: Right arm   Patient Position: Sitting   Cuff Size: Adult   Pulse: 73   SpO2: 98%   Weight: 85.7 kg (189 lb)   Height: 177.8 cm (70\")     Facility age limit for growth %graciela is 20 years.  Body mass index is 27.12 kg/m².     Physical Exam  Vitals and nursing note reviewed.   Constitutional:       Appearance: Normal appearance.   HENT:      Head: Normocephalic and atraumatic.      Nose: Nose normal.      Mouth/Throat:      Mouth: Mucous membranes are moist.      Pharynx: Oropharynx is clear. No posterior oropharyngeal erythema.   Cardiovascular:      Rate and Rhythm: Normal rate and regular rhythm.   Pulmonary:      Effort: Pulmonary effort is normal.      Breath sounds: Normal breath sounds.   Abdominal:      Tenderness: There is no abdominal tenderness.   Musculoskeletal:         General: Normal range of motion.      Cervical back: Normal range of motion and neck supple.      Right lower leg: No edema.      Left lower leg: No edema.   Skin:     General: Skin is warm and dry.   Neurological:      General: No focal deficit present.      Mental Status: She is alert.   Psychiatric:         Mood and Affect: Mood normal.         Behavior: Behavior normal.         Procedures    PHQ-9 Total Score:      Assessment / Plan      Assessment/Plan:   Diagnoses and all orders for this visit:    1. Mixed hyperlipidemia (Primary)  -     Comprehensive Metabolic Panel; Future  -     Lipid Panel; Future    2. Acute right-sided low back pain with right-sided sciatica    3. Primary osteoarthritis, unspecified site    4. Anxiety  -     ALPRAZolam (XANAX) 0.5 MG tablet; Take 1 tablet by mouth 3 (Three) Times a Day As Needed for Anxiety.  Dispense: 60 tablet; Refill: 0    5. Other migraine without status migrainosus, not intractable  -     topiramate (TOPAMAX) 50 MG tablet; Take 1 tablet by mouth 2 (Two) Times a Day.  Dispense: 180 tablet; Refill: 1    6. Primary insomnia  -     zolpidem (AMBIEN) 10 " MG tablet; Take 1 tablet by mouth At Night As Needed for Sleep.  Dispense: 30 tablet; Refill: 2    7. High risk medication use  -     CBC Auto Differential; Future  -     Comprehensive Metabolic Panel; Future  -     POC Urine Drug Screen, Triage    8. Snoring  -     Ambulatory Referral to ENT (Otolaryngology)         Labs reviewed with her    For her hyperlipidemia we did the revised PCE CV risk calculation and she is only 1.2% risk of having a cardiovascular event the next 10 years continue with good diet exercise to treat her hyperlipidemia avoid fried fatty foods    Refilled her Xanax for anxiety as she uses it occasionally maybe 4 or 5 times a week and Ambien for her insomnia she generally is taking a half a tablet per day and doing well she signed the consent [place] reviewed UDS up-to-date    Referral to ENT for sleep study    She just uses ibuprofen now for occasional back pain she understands risk benefits side effects of this medicine and if she is on it more than a week or 2 I told her to get back on her Prilosec at that point for GI protection as she knows it could cause a GI bleed and kidney damage etc.    Immunizations are up-to-date.  Follow-up with her gynecologist regular checkups as directed         Follow Up:   Return in about 6 months (around 4/30/2025) for Annual physical, Labs prior next visit.        Alfredito Rocha MD  [facility]   Portions of note created with Dragon voice recognition technology

## 2024-12-17 ENCOUNTER — OFFICE VISIT (OUTPATIENT)
Dept: OBSTETRICS AND GYNECOLOGY | Facility: CLINIC | Age: 59
End: 2024-12-17
Payer: COMMERCIAL

## 2024-12-17 VITALS
WEIGHT: 188 LBS | BODY MASS INDEX: 26.98 KG/M2 | DIASTOLIC BLOOD PRESSURE: 76 MMHG | SYSTOLIC BLOOD PRESSURE: 124 MMHG | RESPIRATION RATE: 14 BRPM

## 2024-12-17 DIAGNOSIS — Z01.419 WELL WOMAN EXAM: Primary | ICD-10-CM

## 2024-12-17 DIAGNOSIS — Z71.85 VACCINE COUNSELING: ICD-10-CM

## 2024-12-17 DIAGNOSIS — N95.0 POST-MENOPAUSAL BLEEDING: ICD-10-CM

## 2024-12-17 PROCEDURE — 99396 PREV VISIT EST AGE 40-64: CPT | Performed by: OBSTETRICS & GYNECOLOGY

## 2024-12-17 RX ORDER — ESTRADIOL 0.5 MG/1
0.5 TABLET ORAL DAILY
Qty: 90 TABLET | Refills: 4 | Status: SHIPPED | OUTPATIENT
Start: 2024-12-17

## 2024-12-17 RX ORDER — MEDROXYPROGESTERONE ACETATE 2.5 MG/1
2.5 TABLET ORAL DAILY
Qty: 90 TABLET | Refills: 4 | Status: SHIPPED | OUTPATIENT
Start: 2024-12-17

## 2024-12-17 NOTE — PROGRESS NOTES
Subjective   Chief Complaint   Patient presents with    Gynecologic Exam     Tracey Shukla is a 59 y.o. year old  menopausal female presenting to be seen for her annual exam.      This past year she has been on hormone replacement therapy.  She has had any vaginal bleeding in the last 12 months.  Had an ultrasound in July that was normal other than a little fluid in the endometrial canal but thin endometrial echocomplex.  She has had 1 subsequent episode of small bleeding since.  Menopausal symptoms are not present.    SEXUAL Hx:  She is currently sexually active.  In the past year there there has been NO new sexual partners.    Condoms are never used.  She would not like to be screened for STD's at today's exam.  [place] is painful: no  HEALTH Hx:  She exercises regularly: yes.  She wears her seat belt: yes.  She has concerns about domestic violence: no.  She has noticed changes in height: no.    The following portions of the patient's history were reviewed and updated as appropriate:problem list, current medications, allergies, past family history, past medical history, past social history, and past surgical history.    Social History    Tobacco Use      Smoking status: Never        Passive exposure: Never      Smokeless tobacco: Never      Review of Systems  Constitutional POS: nothing reported    NEG: anorexia or night sweats   Genitourinary POS: frequency, urgency, and it IS NOT effecting her daily living    NEG: dysuria or hematuria      Gastointestinal POS: nothing reported    NEG: bloating, change in bowel habits, melena, or reflux symptoms   Integument POS: nothing reported    NEG: moles that are changing in size, shape, color or rashes   Breast POS: nothing reported    NEG: persistent breast lump, skin dimpling, or nipple discharge        Objective   /76   Resp 14   Wt 85.3 kg (188 lb)   LMP  (LMP Unknown)   BMI 26.98 kg/m²     General:  well developed; well nourished  no acute  distress  mentation appropriate   Skin:  No suspicious lesions seen   Thyroid: normal to inspection and palpation   Breasts:  Examined in supine position  Symmetric without masses or skin dimpling  Nipples normal without inversion, lesions or discharge  There are no palpable axillary nodes   Abdomen: soft, non-tender; no masses  no umbilical or inguinal hernias are present  no hepato-splenomegaly   Pelvis: Clinical staff was present for exam  External genitalia:  normal appearance of the external genitalia including Barth[place]'s and [place]'s glands.  :  urethral meatus normal;  Vaginal:  normal pink mucosa without prolapse or lesions.  Cervix:  normal appearance. stenotic;  Uterus:  normal size, shape and consistency.  Adnexa:  normal bimanual exam of the adnexa.  Rectal:  digital rectal exam not performed; anus visually normal appearing.        Assessment   Normal GYN exam in menopause   spotting with normal TV U/S 7/2024 which most likely is all benign and just related to dysfunctional bleeding on hormone therapy  Menopausal female currently on HRT - without significant symptoms affecting activities of daily living  She is up to date on all relevant gynecologic and colorectal screenings       Plan   Pap was not done today.  I explained to Tracey that the recommendations for Pap smear interval in a low risk patient has lengthened to 3 years time.  I told Tracey she still needs to be seen in our office yearly for a full physical including breast and pelvic exam.  I explained to Tracey that she needs to be set up for a saline infusion sonogram.  The saline infusion sonogram will stratify her into 1 of 3 categories: if the endometrial echo complex is thin no additional workup is needed;  if the endometrial echo complex is uniformly thick endometrial sampling will be recommended; if there is focal pathology, a hysteroscopy with D & C be recommended to remove the focal pathology.  She was encouraged to get yearly  mammograms.  She should report any palpable breast lump(s) or skin changes regardless of mammographic findings.  I explained to Tracey that notification regarding her mammogram results will come from the center performing the study.  Our office will not be routinely calling with mammogram results.  It is her responsibility to make sure that the results from the mammogram are communicated to her by the breast center.  If she has any questions about the results, she is welcome to call our office anytime.  I have counseled the patient on the importance of staying up to date with preventive vaccines as well as the risks and benefits of these vaccines.  Her vaccine record was reviewed and updated.  The importance of keeping all planned follow-up and taking all medications as prescribed was emphasized.  Follow up at the time of saline infusion sonogram     New Medications Ordered This Visit   Medications    estradiol (Estrace) 0.5 MG tablet     Sig: Take 1 tablet by mouth Daily.     Dispense:  90 tablet     Refill:  4    medroxyPROGESTERone (Provera) 2.5 MG tablet     Sig: Take 1 tablet by mouth Daily.     Dispense:  90 tablet     Refill:  4          This note was electronically signed.    Jose Maria Hines M.D.  December 17, 2024    Part of this note may be an electronic transcription/translation of spoken language to printed text using the Dragon Dictation System.

## 2024-12-26 DIAGNOSIS — M62.838 MUSCLE SPASM: ICD-10-CM

## 2024-12-26 RX ORDER — CYCLOBENZAPRINE HCL 10 MG
10 TABLET ORAL NIGHTLY PRN
Qty: 30 TABLET | Refills: 0 | Status: SHIPPED | OUTPATIENT
Start: 2024-12-26

## 2025-01-27 ENCOUNTER — OFFICE VISIT (OUTPATIENT)
Dept: OBSTETRICS AND GYNECOLOGY | Facility: CLINIC | Age: 60
End: 2025-01-27
Payer: COMMERCIAL

## 2025-01-27 DIAGNOSIS — N95.0 POST-MENOPAUSAL BLEEDING: Primary | ICD-10-CM

## 2025-01-27 NOTE — PROGRESS NOTES
Saline Infusion Sonogram     Date of procedure:  January 27, 2025     Risks and benefits were discussed.  All questions were answered.  Consents given by the patient verbally.     Pre-op indication:  Post-menopausal bleeding     Procedure documentation:    After the patient was identified and informed consent given she was placed in dorsal lithotomy position in stirrups and draped. A sterile speculum was placed inside the vagina with good visualization of the cervix and the cervix was cleaned with Betadine swabs.  The cervix was grasped with a single-tooth tenaculum.  A balloon catheter was introduced through the cervix without complication and inflated. The speculum was removed. The uterine cavity was then evaluated with transvaginal ultrasonography while saline was being instilled.    The findings were as follows:  The bilayer endometrial stripe measured < 4 mm.  Focal lesions were absent    The balloon was then released and the cavity was then drained of saline and the catheter was removed. Scant bleeding was noted from the cervical lip and the procedure was completed. The patient tolerated the procedure well and was given follow-up instructions.      Impression: Normal cavity without focal findings & with a thin uniform endometrium.  Significant endometrial pathology is unlikely   Plan: No additional workup is needed     This note was electronically signed.    Jose Maria Hines M.D.  January 27, 2025

## 2025-02-13 RX ORDER — IBUPROFEN 800 MG/1
800 TABLET, FILM COATED ORAL EVERY 8 HOURS PRN
Qty: 90 TABLET | Refills: 1 | Status: SHIPPED | OUTPATIENT
Start: 2025-02-13

## 2025-02-13 NOTE — TELEPHONE ENCOUNTER
Rx Refill Note    Requested Prescriptions     Pending Prescriptions Disp Refills    ibuprofen (ADVIL,MOTRIN) 800 MG tablet 90 tablet 0     Sig: Take 1 tablet by mouth Every 8 (Eight) Hours As Needed for Mild Pain.        Last office visit with prescribing clinician: 10/31/2024      Next office visit with prescribing clinician: 5/2/2025   Last labs:   Last refill: 08/26/2024   Pharmacy (be sure to add in Epic). correct

## 2025-04-24 RX ORDER — IBUPROFEN 800 MG/1
800 TABLET, FILM COATED ORAL EVERY 8 HOURS PRN
Qty: 90 TABLET | Refills: 0 | Status: SHIPPED | OUTPATIENT
Start: 2025-04-24

## 2025-04-25 ENCOUNTER — LAB (OUTPATIENT)
Dept: FAMILY MEDICINE CLINIC | Facility: CLINIC | Age: 60
End: 2025-04-25
Payer: COMMERCIAL

## 2025-04-25 DIAGNOSIS — E78.2 MIXED HYPERLIPIDEMIA: ICD-10-CM

## 2025-04-25 DIAGNOSIS — Z79.899 HIGH RISK MEDICATION USE: ICD-10-CM

## 2025-04-26 LAB
ALBUMIN SERPL-MCNC: 4.3 G/DL (ref 3.8–4.9)
ALP SERPL-CCNC: 56 IU/L (ref 44–121)
ALT SERPL-CCNC: 13 IU/L (ref 0–32)
AST SERPL-CCNC: 14 IU/L (ref 0–40)
BASOPHILS # BLD AUTO: 0.1 X10E3/UL (ref 0–0.2)
BASOPHILS NFR BLD AUTO: 2 %
BILIRUB SERPL-MCNC: 0.5 MG/DL (ref 0–1.2)
BUN SERPL-MCNC: 18 MG/DL (ref 6–24)
BUN/CREAT SERPL: 21 (ref 9–23)
CALCIUM SERPL-MCNC: 9 MG/DL (ref 8.7–10.2)
CHLORIDE SERPL-SCNC: 109 MMOL/L (ref 96–106)
CHOLEST SERPL-MCNC: 218 MG/DL (ref 100–199)
CO2 SERPL-SCNC: 21 MMOL/L (ref 20–29)
CREAT SERPL-MCNC: 0.86 MG/DL (ref 0.57–1)
EGFRCR SERPLBLD CKD-EPI 2021: 78 ML/MIN/1.73
EOSINOPHIL # BLD AUTO: 0.1 X10E3/UL (ref 0–0.4)
EOSINOPHIL NFR BLD AUTO: 2 %
ERYTHROCYTE [DISTWIDTH] IN BLOOD BY AUTOMATED COUNT: 12.4 % (ref 11.7–15.4)
GLOBULIN SER CALC-MCNC: 1.8 G/DL (ref 1.5–4.5)
GLUCOSE SERPL-MCNC: 94 MG/DL (ref 70–99)
HCT VFR BLD AUTO: 42 % (ref 34–46.6)
HDLC SERPL-MCNC: 89 MG/DL
HGB BLD-MCNC: 13.7 G/DL (ref 11.1–15.9)
IMM GRANULOCYTES # BLD AUTO: 0 X10E3/UL (ref 0–0.1)
IMM GRANULOCYTES NFR BLD AUTO: 0 %
LDLC SERPL CALC-MCNC: 119 MG/DL (ref 0–99)
LYMPHOCYTES # BLD AUTO: 1.5 X10E3/UL (ref 0.7–3.1)
LYMPHOCYTES NFR BLD AUTO: 37 %
MCH RBC QN AUTO: 31.1 PG (ref 26.6–33)
MCHC RBC AUTO-ENTMCNC: 32.6 G/DL (ref 31.5–35.7)
MCV RBC AUTO: 96 FL (ref 79–97)
MONOCYTES # BLD AUTO: 0.4 X10E3/UL (ref 0.1–0.9)
MONOCYTES NFR BLD AUTO: 9 %
NEUTROPHILS # BLD AUTO: 2 X10E3/UL (ref 1.4–7)
NEUTROPHILS NFR BLD AUTO: 50 %
PLATELET # BLD AUTO: 202 X10E3/UL (ref 150–450)
POTASSIUM SERPL-SCNC: 4 MMOL/L (ref 3.5–5.2)
PROT SERPL-MCNC: 6.1 G/DL (ref 6–8.5)
RBC # BLD AUTO: 4.4 X10E6/UL (ref 3.77–5.28)
SODIUM SERPL-SCNC: 142 MMOL/L (ref 134–144)
TRIGL SERPL-MCNC: 58 MG/DL (ref 0–149)
VLDLC SERPL CALC-MCNC: 10 MG/DL (ref 5–40)
WBC # BLD AUTO: 4.1 X10E3/UL (ref 3.4–10.8)

## 2025-05-02 ENCOUNTER — OFFICE VISIT (OUTPATIENT)
Dept: FAMILY MEDICINE CLINIC | Facility: CLINIC | Age: 60
End: 2025-05-02
Payer: COMMERCIAL

## 2025-05-02 VITALS
OXYGEN SATURATION: 97 % | BODY MASS INDEX: 28 KG/M2 | HEIGHT: 70 IN | HEART RATE: 68 BPM | WEIGHT: 195.6 LBS | SYSTOLIC BLOOD PRESSURE: 124 MMHG | RESPIRATION RATE: 16 BRPM | DIASTOLIC BLOOD PRESSURE: 70 MMHG

## 2025-05-02 DIAGNOSIS — F41.9 ANXIETY: ICD-10-CM

## 2025-05-02 DIAGNOSIS — J01.00 ACUTE NON-RECURRENT MAXILLARY SINUSITIS: ICD-10-CM

## 2025-05-02 DIAGNOSIS — E78.2 MIXED HYPERLIPIDEMIA: ICD-10-CM

## 2025-05-02 DIAGNOSIS — Z00.00 ANNUAL PHYSICAL EXAM: ICD-10-CM

## 2025-05-02 DIAGNOSIS — I49.3 PVC (PREMATURE VENTRICULAR CONTRACTION): ICD-10-CM

## 2025-05-02 DIAGNOSIS — M62.838 MUSCLE SPASM: ICD-10-CM

## 2025-05-02 DIAGNOSIS — F51.01 PRIMARY INSOMNIA: ICD-10-CM

## 2025-05-02 DIAGNOSIS — G43.809 OTHER MIGRAINE WITHOUT STATUS MIGRAINOSUS, NOT INTRACTABLE: ICD-10-CM

## 2025-05-02 DIAGNOSIS — Z79.899 HIGH RISK MEDICATION USE: Primary | ICD-10-CM

## 2025-05-02 LAB
POC AMPHETAMINES: NEGATIVE
POC BARBITURATES: NEGATIVE
POC BENZODIAZEPHINES: POSITIVE
POC BUPRENORPHINE: NEGATIVE
POC COCAINE: NEGATIVE
POC METHADONE: NEGATIVE
POC METHAMPHETAMINE SCREEN URINE: NEGATIVE
POC OPIATES: NEGATIVE
POC OXYCODONE: NEGATIVE
POC PHENCYCLIDINE: NEGATIVE
POC PROPOXYPHENE: NEGATIVE
POC THC: NEGATIVE
POC TRICYCLIC ANTIDEPRESSANTS: NEGATIVE

## 2025-05-02 RX ORDER — CYCLOBENZAPRINE HCL 10 MG
10 TABLET ORAL NIGHTLY PRN
Qty: 30 TABLET | Refills: 0 | Status: SHIPPED | OUTPATIENT
Start: 2025-05-02

## 2025-05-02 RX ORDER — ZOLPIDEM TARTRATE 10 MG/1
10 TABLET ORAL NIGHTLY PRN
Qty: 30 TABLET | Refills: 2 | Status: SHIPPED | OUTPATIENT
Start: 2025-05-02

## 2025-05-02 RX ORDER — AZITHROMYCIN 250 MG/1
TABLET, FILM COATED ORAL
Qty: 6 TABLET | Refills: 0 | Status: SHIPPED | OUTPATIENT
Start: 2025-05-02

## 2025-05-02 RX ORDER — TOPIRAMATE 50 MG/1
50 TABLET, FILM COATED ORAL 2 TIMES DAILY
Qty: 180 TABLET | Refills: 1 | Status: SHIPPED | OUTPATIENT
Start: 2025-05-02

## 2025-05-02 RX ORDER — ALPRAZOLAM 0.5 MG
0.5 TABLET ORAL 3 TIMES DAILY PRN
Qty: 60 TABLET | Refills: 0 | Status: SHIPPED | OUTPATIENT
Start: 2025-05-02

## 2025-05-02 NOTE — PROGRESS NOTES
Female Physical Note      Patient Name: Tracey Shukla  : 1965   MRN: [id number]     Chief Complaint:    Chief Complaint   Patient presents with    Annual Exam       History of Present Illness: Tracey Shukla is a 60 y.o. female who is here today for their annual health maintenance and physical.  She is also here to follow-up on her chronic medical problems and go over blood work and get medicines refilled.    She relates that she is going to need right total knee replacement set sets for  as the shots in March really did not help her much and its bone-on-bone Dr Sun he will be doing this through [facility] orthopedics and they are going to do EKG and preop evaluation want her to hold her Motrin and hormones prior to surgery    She also relates she has had a cold watery eyes congestion sore throat green drainage she has been using her Flonase and allergy medicine and Stahist but not better and would like an antibiotic.        History of Present Illness        Subjective      Review of Systems:   Review of Systems.[email or web address]  Review of Systems   Constitutional: Negative for fatigue and fever.   HENT: Negative for ear pain and does have slight sore throat.  And congestion-- maxillary sinus pressure  Eyes: Negative for visual disturbance.   Respiratory: Positive for occasional cough, no chest tightness and shortness of breath.    Cardiovascular: Negative for chest pain and palpitations.   Gastrointestinal: Negative for abdominal pain, blood in stool, melena, constipation, diarrhea, nausea and vomiting.   Endocrine: Negative for cold intolerance and heat intolerance.   Genitourinary: Negative for dysuria and hematuria.   Musculoskeletal: Negative for back pain and joint swelling.   Skin: Negative for rash and wound.   Allergic/Immunologic: Negative for environmental allergies and food allergies.       Past Medical History, Social History, Family History and Care Team were all reviewed with patient and  updated as appropriate.     Medications:     Current Outpatient Medications:     ALPRAZolam (XANAX) 0.5 MG tablet, Take 1 tablet by mouth 3 (Three) Times a Day As Needed for Anxiety., Disp: 60 tablet, Rfl: 0    azelastine (ASTEPRO) 0.15 % solution nasal spray, 2 sprays into the nostril(s) as directed by provider Daily As Needed for Rhinitis., Disp: 23 mL, Rfl: 5    cyclobenzaprine (FLEXERIL) 10 MG tablet, Take 1 tablet by mouth At Night As Needed for Muscle Spasms., Disp: 30 tablet, Rfl: 0    estradiol (Estrace) 0.5 MG tablet, Take 1 tablet by mouth Daily., Disp: 90 tablet, Rfl: 4    galcanezumab-gnlm (Emgality) 120 MG/ML auto-injector pen, Inject  under the skin into the appropriate area as directed., Disp: , Rfl:     ibuprofen (ADVIL,MOTRIN) 800 MG tablet, Take 1 tablet by mouth Every 8 (Eight) Hours As Needed for Mild Pain., Disp: 90 tablet, Rfl: 0    medroxyPROGESTERone (Provera) 2.5 MG tablet, Take 1 tablet by mouth Daily., Disp: 90 tablet, Rfl: 4    rizatriptan (MAXALT) 10 MG tablet, rizatriptan 10 mg tablet, Disp: , Rfl:     sertraline (ZOLOFT) 50 MG tablet, Take 1 tablet by mouth Daily., Disp: 90 tablet, Rfl: 3    topiramate (TOPAMAX) 50 MG tablet, Take 1 tablet by mouth 2 (Two) Times a Day., Disp: 180 tablet, Rfl: 1    zolpidem (AMBIEN) 10 MG tablet, Take 1 tablet by mouth At Night As Needed for Sleep., Disp: 30 tablet, Rfl: 2    azithromycin (Zithromax Z-Todd) 250 MG tablet, Take 2 tablets by mouth on day 1, then 1 tablet daily on days 2-5, Disp: 6 tablet, Rfl: 0    Allergies:   No Known Allergies    I  CT for Smoker (Age 55-75, 30pk yr):  na  Bone Density/DEXA: DEXA scan.  As per GYN    Depression: PHQ-2 Depression Screening  PHQ-9 Total Score:        Intimate partner violence: (Screen on initial visit, pregnant women, women with injuries, older adult with injury or evidence of neglect):  Violence can be a problem in many people's lives, so I now ask every patient about trauma or abuse they may have  "experienced in a relationship.  Stress/Safety - Do you feel safe in your relationship?  Afraid/Abused - Have you ever been in a relationship where you were threatened, hurt, or afraid?  Friend/Family - Are your friends aware you have been hurt?  Emergency Plan - Do you have a safe place to go and the resources you need in an emergency?    Osteoporosis:   Ost menopausal women < 65 with RF (advancing age, previous fracture, glucocorticoid therapy, parental hip fracture, low body weight, current cigarette smoking, excessive alcohol consumption, rheumatoid arthritis, secondary osteoporosis [hypogonadism/premature menopause, malabsorption, chronic liver disease, IBD]).  All women 65 or older    Objective     Physical Exam:  Vital Signs:   Vitals:    05/02/25 0946   BP: 124/70   Pulse: 68   Resp: 16   SpO2: 97%   Weight: 88.7 kg (195 lb 9.6 oz)   Height: 177.8 cm (70\")   PainSc: 0-No pain     Body mass index is 28.07 kg/m².        Physical Exam  Vitals and nursing note reviewed.   Constitutional:       General: She is not in acute distress.     Appearance: Normal appearance.   HENT:      Head: Normocephalic and atraumatic.      Right Ear: Tympanic membrane, ear canal and external ear normal.      Left Ear: Tympanic membrane, ear canal and external ear normal.      Nose: Nose normal.      Mouth/Throat:      Mouth: Mucous membranes are moist.      Pharynx: Oropharynx is clear.      Comments: Maxillary sinuses reported to slightly tender  Eyes:      Extraocular Movements: Extraocular movements intact.      Conjunctiva/sclera: Conjunctivae normal.      Pupils: Pupils are equal, round, and reactive to light.   Neck:      Thyroid: No thyroid mass or thyroid tenderness.   Cardiovascular:      Rate and Rhythm: Normal rate and regular rhythm.      Heart sounds: Normal heart sounds.      Comments: RADIAL PULSES NML  Occasional extrasystole with pause  She has history of PVCs declined EKG today as she will be getting 1 in a few " weeks preoperatively  Pulmonary:      Effort: Pulmonary effort is normal.      Breath sounds: Normal breath sounds.   Abdominal:      General: Abdomen is flat. Bowel sounds are normal.      Palpations: Abdomen is soft. There is no mass.      Tenderness: There is no abdominal tenderness. There is no guarding or rebound.   Musculoskeletal:      Cervical back: Normal range of motion and neck supple.      Right lower leg: No edema.      Left lower leg: No edema.   Lymphadenopathy:      Cervical: No cervical adenopathy.   Skin:     General: Skin is warm and dry.      Findings: No rash.   Neurological:      General: No focal deficit present.      Mental Status: She is alert and oriented to person, place, and time.      Cranial Nerves: No cranial nerve deficit.      Sensory: Sensation is intact.      Motor: Motor function is intact.      Deep Tendon Reflexes: Reflexes normal.      Comments: SYMMETRIC PATELLAR REFLEXES  Cranial nerves 2 through 12 intact   Psychiatric:         Attention and Perception: Attention normal.         Mood and Affect: Mood normal.         Behavior: Behavior normal.         Thought Content: Thought content normal.         Judgment: Judgment normal.         Procedures    Assessment / Plan      Assessment/Plan:   Diagnoses and all orders for this visit:    1. Annual physical exam (Primary)    2. Anxiety  -     ALPRAZolam (XANAX) 0.5 MG tablet; Take 1 tablet by mouth 3 (Three) Times a Day As Needed for Anxiety.  Dispense: 60 tablet; Refill: 0  -     sertraline (ZOLOFT) 50 MG tablet; Take 1 tablet by mouth Daily.  Dispense: 90 tablet; Refill: 3    3. Other migraine without status migrainosus, not intractable  -     topiramate (TOPAMAX) 50 MG tablet; Take 1 tablet by mouth 2 (Two) Times a Day.  Dispense: 180 tablet; Refill: 1    4. Primary insomnia  -     zolpidem (AMBIEN) 10 MG tablet; Take 1 tablet by mouth At Night As Needed for Sleep.  Dispense: 30 tablet; Refill: 2    5. Muscle spasm  -      cyclobenzaprine (FLEXERIL) 10 MG tablet; Take 1 tablet by mouth At Night As Needed for Muscle Spasms.  Dispense: 30 tablet; Refill: 0    6. Acute non-recurrent maxillary sinusitis  -     azithromycin (Zithromax Z-Todd) 250 MG tablet; Take 2 tablets by mouth on day 1, then 1 tablet daily on days 2-5  Dispense: 6 tablet; Refill: 0    7. High risk medication use  -     CBC Auto Differential; Future  -     Comprehensive Metabolic Panel; Future  -     Lipid Panel; Future    8. Mixed hyperlipidemia  -     Lipid Panel; Future       Annual physical completed as well as regular office visit    UDS completed she signed the consent [place] reviewed continue with Xanax and Ambien as directed she typically just takes a half of an Ambien.    For sinusitis we will treat with Z-Todd continue other meds as directed    She is going to work on diet and exercise watch carbs to help monitor her weight as she is concerned about her weight gain.    She is no longer working at the [employer] and feels this is why she gained a few pounds.    Follow-up with orthopedics for preop eval as directed    Follow-up in 6 months with repeat blood work and exam    Maintain good diet and exercise as directed and follow-up with other specialists as directed    Old EKG from 2014 reviewed with her she took a photograph of it to take to her other doctor as she had 1 PVC on that EKG  Assessment & Plan        BMI is >= 25 and <30. (Overweight) The following options were offered after discussion;: exercise counseling/recommendations and nutrition counseling/recommendations      Follow Up:   Return in about 6 months (around 11/2/2025) for Recheck, Labs prior next visit.            Alfredito Rocha MD  [facility]  Portions of note created with Dragon voice recognition technology

## 2025-05-02 NOTE — LETTER
Controlled Substance Prescribing Agreement          I, Tracey Shukla [PATIENT],  1965 [] a patient of  Alfredito Rocha MD   [PROVIDER] at [facility] [PRACTICE], have been informed that  individuals who are prescribed certain Controlled Substances including, but not limited to, narcotic pain medicines, stimulants, benzodiazepine tranquilizers, and barbiturate sedatives, can abuse those substances or may allow abuse by others, and have some risk of developing an addictive disorder or suffering a relapse of a prior addiction. Therefore, I have been informed that it is necessary to observe strict rules pertaining to their use, and I agree to follow the terms and procedures described in this Agreement as consideration for, and as a condition of, the willingness of the physician whose signature appears below to consider prescribing or to continue prescribing Controlled Substances to treat my pain.     1. I will inform my physician of any current or past substance abuse, or any current or past substance abuse of any immediate member of my immediate family.     2. I agree that I may be subject to a voluntary evaluation by psychologists and/or psychiatrists, possibly at my own expense, before any Controlled Substances will be prescribed to me. I agree that the need to be evaluated by psychologists and/or psychiatrists may be revisited every three (3) to six (6) months thereafter while taking the medication.     3. All Controlled Substances must come from a provider in the PROVIDER’S PRACTICE. My Controlled Substances will come from the PROVIDER whose signature appears below, or during his or her absence, by the covering provider, unless specific written authorization is obtained from the office for an exception.     4. I will obtain all Controlled Substances from the same pharmacy. Should the need arise to change pharmacies, I will inform the PROVIDER’S office.     5. I will inform the  PROVIDER’S office of any new medications or medical conditions, and of any adverse effects I experience from any of the medications that I take.     6. I will inform my other health care providers that I am taking the Controlled Substances listed above, and of the existence of this Agreement. In the event of an emergency, I will provide the foregoing information to emergency department providers.     7. I agree that my prescribing PROVIDER has permission to discuss all diagnostic and treatment details with other health care providers, pharmacists, or other professionals who provide my health care regarding my use of Controlled Substances for purposes of maintaining accountability.     8. I will not allow anyone else to have, use sell, or otherwise have access to these medications. The sharing of medications with anyone is absolutely forbidden and is against the law.         9. I understand that Controlled Substances may be hazardous or lethal to a person who is not tolerant to their effects, especially a child, and that I must keep them out of reach of such people for their own safety.     10. I understand that tampering with a written prescription is a felony and I will not change or tamper with the PROVIDER’S written prescription.     11. I am aware that attempting to obtain a Controlled Substance under false pretenses is illegal.     12. I agree not to alter my medication in any way, and I will take my medication whole, and it will not be broken, chewed, crushed, injected, or snorted.     13. I will take my medication as instructed and prescribed, and I will not exceed the maximum prescribed dose. Any change in dosage must be approved by the PROVIDER or a physician within the PRACTICE.     14. I understand that these drugs should not be stopped abruptly, as withdrawal syndromes may develop.     15. I will cooperate with unannounced urine or serum toxicology screenings as may be requested, as well as any random  pill counts of medication by the PROVIDER. Failure to comply may result in termination of the PROVIDER-patient relationship.     16. I understand that the presence of unauthorized and/or illegal substances in the screenings described in the paragraph above may prompt referral for assessment for a substance abuse disorder or termination of the PROVIDER-patient relationship.     17. I understand that medications may not be replaced if they are lost, damaged, or stolen. If any of these situations arise that cause me to request an early refill of my medication, a copy of a filed police report or a statement from me explaining the circumstances may be required before additional prescriptions are considered. If I request an early refill secondary to lost, damaged, or stolen prescriptions twice within a year, I may be discharged from the practice.     18. I understand that a prescription may be given early if the PROVIDER or the patient will be out of town when the refill is due. These prescriptions will contain instructions to the pharmacist that the prescriptions(s) may not be filled prior to the appropriate date.     19. If the responsible legal authorities have questions concerning my treatment, as may occur, for example, if I obtained medication at several pharmacies, all confidentiality is waived, and these authorities may be given full access to my full records of Controlled Substances administration.     20. I will keep my scheduled appointments in order to receive medication renewals. If I need to cancel my appointment, I will do so a minimum of twenty-four (24) hours before it is scheduled.     21. I understand that I may be asked to bring my medications in their original container to the PROVIDER’s office while I am on controlled medication.     22. Refills generally will not be given over the phone, after office hours, during the weekends, and on holidays.     23. I understand that any medical treatment is  initially a trial, with the goal of treatment being to improve the quality of life and ability to function and/or work. These parameters will be assessed periodically to determine the benefits of continued therapy, and continued prescription is contingent on whether my physician believes that the medication usage benefits me. I will comply with all treatments as outlined by the PROVIDER.     24. I have been explained the risks and potential benefits of these therapies, including, but not limited to, psychological addiction, physical dependence, withdrawal and over dosage.     25. I understand that failure to adhere to these policies and/or failure to comply with the PROVIDER’S treatment plan may result in cessation of therapy with Controlled Substance prescribing by the PROVIDER or referral for further specialty assessment, as well as possible discharge from the PRACTICE.     26. I, the undersigned patient, attest that the foregoing was discussed with me, and that I have read, fully understand, and agree to all of the above requirements and instructions. I affirm that I have the full right and power to sign and be bound by this  Agreement.         Date:  __________________________________________    Patient Printed Name:  Tracey Shukla    Patient Signature:  ________________________________           Date:  __________________________________________    Provider Printed Name: Alfredito Rocha MD    Provider Signature:  _______________________________

## 2025-05-02 NOTE — PATIENT INSTRUCTIONS
Health Maintenance, Female  Adopting a healthy lifestyle and getting preventive care can go a long way to promote health and wellness. Talk with your health care provider about what schedule of regular examinations is right for you. This is a good chance for you to check in with your provider about disease prevention and staying healthy.  In between checkups, there are plenty of things you can do on your own. Experts have done a lot of research about which lifestyle changes and preventive measures are most likely to keep you healthy. Ask your health care provider for more information.  Weight and diet  Eat a healthy diet  Be sure to include plenty of vegetables, fruits, low-fat dairy products, and lean protein.  Do not eat a lot of foods high in solid fats, added sugars, or salt.  Get regular exercise. This is one of the most important things you can do for your health.  Most adults should exercise for at least 150 minutes each week. The exercise should increase your heart rate and make you sweat (moderate-intensity exercise).  Most adults should also do strengthening exercises at least twice a week. This is in addition to the moderate-intensity exercise.     Maintain a healthy weight  Body mass index (BMI) is a measurement that can be used to identify possible weight problems. It estimates body fat based on height and weight. Your health care provider can help determine your BMI and help you achieve or maintain a healthy weight.  For females 20 years of age and older:  A BMI below 18.5 is considered underweight.  A BMI of 18.5 to 24.9 is normal.  A BMI of 25 to 29.9 is considered overweight.  A BMI of 30 and above is considered obese.     Watch levels of cholesterol and blood lipids  You should start having your blood tested for lipids and cholesterol at 20 years of age, then have this test every 5 years.  You may need to have your cholesterol levels checked more often if:  Your lipid or cholesterol levels are  high.  You are older than 50 years of age.  You are at high risk for heart disease.     Cancer screening  Lung Cancer  Lung cancer screening is recommended for adults 55-80 years old who are at high risk for lung cancer because of a history of smoking.  A yearly low-dose CT scan of the lungs is recommended for people who:  Currently smoke.  Have quit within the past 15 years.  Have at least a 30-pack-year history of smoking. A pack year is smoking an average of one pack of cigarettes a day for 1 year.  Yearly screening should continue until it has been 15 years since you quit.  Yearly screening should stop if you develop a health problem that would prevent you from having lung cancer treatment.     Breast Cancer  Practice breast self-awareness. This means understanding how your breasts normally appear and feel.  It also means doing regular breast self-exams. Let your health care provider know about any changes, no matter how small.  If you are in your 20s or 30s, you should have a clinical breast exam (CBE) by a health care provider every 1-3 years as part of a regular health exam.  If you are 40 or older, have a CBE every year. Also consider having a breast X-ray (mammogram) every year.  If you have a family history of breast cancer, talk to your health care provider about genetic screening.  If you are at high risk for breast cancer, talk to your health care provider about having an MRI and a mammogram every year.  Breast cancer gene (BRCA) assessment is recommended for women who have family members with BRCA-related cancers. BRCA-related cancers include:  Breast.  Ovarian.  Tubal.  Peritoneal cancers.  Results of the assessment will determine the need for genetic counseling and BRCA1 and BRCA2 testing.     Cervical Cancer  Your health care provider may recommend that you be screened regularly for cancer of the pelvic organs (ovaries, uterus, and vagina). This screening involves a pelvic examination, including  checking for microscopic changes to the surface of your cervix (Pap test). You may be encouraged to have this screening done every 3 years, beginning at age 21.  For women ages 30-65, health care providers may recommend pelvic exams and Pap testing every 3 years, or they may recommend the Pap and pelvic exam, combined with testing for human papilloma virus (HPV), every 5 years. Some types of HPV increase your risk of cervical cancer. Testing for HPV may also be done on women of any age with unclear Pap test results.  Other health care providers may not recommend any screening for nonpregnant women who are considered low risk for pelvic cancer and who do not have symptoms. Ask your health care provider if a screening pelvic exam is right for you.  If you have had past treatment for cervical cancer or a condition that could lead to cancer, you need Pap tests and screening for cancer for at least 20 years after your treatment. If Pap tests have been discontinued, your risk factors (such as having a new sexual partner) need to be reassessed to determine if screening should resume. Some women have medical problems that increase the chance of getting cervical cancer. In these cases, your health care provider may recommend more frequent screening and Pap tests.     Colorectal Cancer  This type of cancer can be detected and often prevented.  Routine colorectal cancer screening usually begins at 50 years of age and continues through 75 years of age.  Your health care provider may recommend screening at an earlier age if you have risk factors for colon cancer.  Your health care provider may also recommend using home test kits to check for hidden blood in the stool.  A small camera at the end of a tube can be used to examine your colon directly (sigmoidoscopy or colonoscopy). This is done to check for the earliest forms of colorectal cancer.  Routine screening usually begins at age 50.  Direct examination of the colon should  be repeated every 5-10 years through 75 years of age. However, you may need to be screened more often if early forms of precancerous polyps or small growths are found.     Skin Cancer  Check your skin from head to toe regularly.  Tell your health care provider about any new moles or changes in moles, especially if there is a change in a mole's shape or color.  Also tell your health care provider if you have a mole that is larger than the size of a pencil eraser.  Always use sunscreen. Apply sunscreen liberally and repeatedly throughout the day.  Protect yourself by wearing long sleeves, pants, a wide-brimmed hat, and sunglasses whenever you are outside.     Heart disease, diabetes, and high blood pressure  High blood pressure causes heart disease and increases the risk of stroke. High blood pressure is more likely to develop in:  People who have blood pressure in the high end of the normal range (130-139/85-89 mm Hg).  People who are overweight or obese.  People who are .  If you are 18-39 years of age, have your blood pressure checked every 3-5 years. If you are 40 years of age or older, have your blood pressure checked every year. You should have your blood pressure measured twice--once when you are at a hospital or clinic, and once when you are not at a hospital or clinic. Record the average of the two measurements. To check your blood pressure when you are not at a hospital or clinic, you can use:  An automated blood pressure machine at a pharmacy.  A home blood pressure monitor.  If you are between 55 years and 79 years old, ask your health care provider if you should take aspirin to prevent strokes.  Have regular diabetes screenings. This involves taking a blood sample to check your fasting blood sugar level.  If you are at a normal weight and have a low risk for diabetes, have this test once every three years after 45 years of age.  If you are overweight and have a high risk for diabetes,  consider being tested at a younger age or more often.  Preventing infection  Hepatitis B  If you have a higher risk for hepatitis B, you should be screened for this virus. You are considered at high risk for hepatitis B if:  You were born in a country where hepatitis B is common. Ask your health care provider which countries are considered high risk.  Your parents were born in a high-risk country, and you have not been immunized against hepatitis B (hepatitis B vaccine).  You have HIV or AIDS.  You use needles to inject street drugs.  You live with someone who has hepatitis B.  You have had sex with someone who has hepatitis B.  You get hemodialysis treatment.  You take certain medicines for conditions, including cancer, organ transplantation, and autoimmune conditions.     Hepatitis C  Blood testing is recommended for:  Everyone born from 1945 through 1965.  Anyone with known risk factors for hepatitis C.     Sexually transmitted infections (STIs)  You should be screened for sexually transmitted infections (STIs) including gonorrhea and chlamydia if:  You are sexually active and are younger than 24 years of age.  You are older than 24 years of age and your health care provider tells you that you are at risk for this type of infection.  Your sexual activity has changed since you were last screened and you are at an increased risk for chlamydia or gonorrhea. Ask your health care provider if you are at risk.  If you do not have HIV, but are at risk, it may be recommended that you take a prescription medicine daily to prevent HIV infection. This is called pre-exposure prophylaxis (PrEP). You are considered at risk if:  You are sexually active and do not regularly use condoms or know the HIV status of your partner(s).  You take drugs by injection.  You are sexually active with a partner who has HIV.     Talk with your health care provider about whether you are at high risk of being infected with HIV. If you choose to  begin PrEP, you should first be tested for HIV. You should then be tested every 3 months for as long as you are taking PrEP.  Pregnancy  If you are premenopausal and you may become pregnant, ask your health care provider about preconception counseling.  If you may become pregnant, take 400 to 800 micrograms (mcg) of folic acid every day.  If you want to prevent pregnancy, talk to your health care provider about birth control (contraception).  Osteoporosis and menopause  Osteoporosis is a disease in which the bones lose minerals and strength with aging. This can result in serious bone fractures. Your risk for osteoporosis can be identified using a bone density scan.  If you are 65 years of age or older, or if you are at risk for osteoporosis and fractures, ask your health care provider if you should be screened.  Ask your health care provider whether you should take a calcium or vitamin D supplement to lower your risk for osteoporosis.  Menopause may have certain physical symptoms and risks.  Hormone replacement therapy may reduce some of these symptoms and risks.  Talk to your health care provider about whether hormone replacement therapy is right for you.  Follow these instructions at home:  Schedule regular health, dental, and eye exams.  Stay current with your immunizations.  Do not use any tobacco products including cigarettes, chewing tobacco, or electronic cigarettes.  If you are pregnant, do not drink alcohol.  If you are breastfeeding, limit how much and how often you drink alcohol.  Limit alcohol intake to no more than 1 drink per day for nonpregnant women. One drink equals 12 ounces of beer, 5 ounces of wine, or 1½ ounces of hard liquor.  Do not use street drugs.  Do not share needles.  Ask your health care provider for help if you need support or information about quitting drugs.  Tell your health care provider if you often feel depressed.  Tell your health care provider if you have ever been abused or do  not feel safe at home.  This information is not intended to replace advice given to you by your health care provider. Make sure you discuss any questions you have with your health care provider.  Document Released: 07/02/2012 Document Revised: 05/25/2017 Document Reviewed: 09/20/2016  [employer] Patient Education © 2018 [employer]

## 2025-07-03 ENCOUNTER — OFFICE VISIT (OUTPATIENT)
Dept: FAMILY MEDICINE CLINIC | Facility: CLINIC | Age: 60
End: 2025-07-03
Payer: COMMERCIAL

## 2025-07-03 VITALS
HEIGHT: 70 IN | HEART RATE: 70 BPM | WEIGHT: 194 LBS | SYSTOLIC BLOOD PRESSURE: 144 MMHG | BODY MASS INDEX: 27.77 KG/M2 | OXYGEN SATURATION: 99 % | DIASTOLIC BLOOD PRESSURE: 90 MMHG | RESPIRATION RATE: 14 BRPM

## 2025-07-03 DIAGNOSIS — E53.8 VITAMIN B 12 DEFICIENCY: ICD-10-CM

## 2025-07-03 DIAGNOSIS — Z79.899 HIGH RISK MEDICATION USE: ICD-10-CM

## 2025-07-03 DIAGNOSIS — R03.0 ELEVATED BLOOD PRESSURE READING: Primary | ICD-10-CM

## 2025-07-03 DIAGNOSIS — Z96.651 STATUS POST RIGHT KNEE REPLACEMENT: ICD-10-CM

## 2025-07-03 DIAGNOSIS — E55.9 VITAMIN D DEFICIENCY: ICD-10-CM

## 2025-07-03 PROCEDURE — 99214 OFFICE O/P EST MOD 30 MIN: CPT | Performed by: FAMILY MEDICINE

## 2025-07-03 RX ORDER — OXYCODONE HYDROCHLORIDE 5 MG/1
5 TABLET ORAL EVERY 12 HOURS PRN
COMMUNITY
Start: 2025-06-25

## 2025-07-03 NOTE — PROGRESS NOTES
Follow Up Office Visit      Patient Name: Tracey Shukla  : 1965   MRN: [id number]     Chief Complaint:    Chief Complaint   Patient presents with    mixed hyperlipidemia       History of Present Illness: Tracey Shukla is a 60 y.o. female who is here today to   be evaluated after right total knee replacement and says her blood pressure numbers have been all over the place she says 1 day may be 100 systolic then the next day 140 and then the next day 136 etc.  She said they check her blood pressure at physical therapy and asked her to follow-up with her primary care physician.    She has been doing pretty well and swelling has gone down in her knee and she is now to just using a cane and is off the walker.  She has 90 degrees flexion at this point    She denies any chest pain shortness of breath or stroke symptoms no visual loss no weakness on one side of her body    Says she does occasionally get some spasms in the leg and the muscles around the knee.    She does still require half an Ambien to go to sleep and knows about the interactions with pain medicines and other sedating medicines and to be careful regarding respiratory depression.  She says she is trying to use the tramadol mostly occasional use oxycodone if the pain is more severe and says last night it woke her up at 2 AM so she had to take a Percocet.    Review of Systems   Constitutional: Negative for fatigue and fever.   Respiratory: Negative for cough and shortness of breath.    Cardiovascular: Negative for chest pain and palpitations.   Skin: Negative for rash or itching      History of Present Illness        Subjective      Review of Systems:   Review of Systems    Past Medical History:   Past Medical History:   Diagnosis Date    Anxiety - PCP manages 2011    Depression 2011    Work, and family issues    Diverticulosis 2015    Seen incidentally at colonoscopy    Frozen shoulder - right 2017    Goiter     H/O migraine 2011    Insomnia      Migraine 2010    Rib fractures - kicked by a race horse 03/2023       Past Surgical History:   Past Surgical History:   Procedure Laterality Date    CLOSED MANIPULATION SHOULDER Right 2017    COLONOSCOPY  2015    Had a colonoscopy 12-    INCISIONAL HERNIA REPAIR  2006    From the [facility] oophorectomy    JOINT REPLACEMENT  6/9/2025    Right knee replacement    LAPAROSCOPIC SALPINGOOPHERECTOMY Left 2005    Enlarged ovary - benign (Dr. Mir López)    THYROID SURGERY  1978    Goiter removed    WISDOM TOOTH EXTRACTION  about 1979    No problems       Family History:   Family History   Problem Relation Age of Onset    Depression Father     Colon cancer Father 88    Lung cancer Mother        Social History:   Social History     Socioeconomic History    Marital status:    Tobacco Use    Smoking status: Never     Passive exposure: Never    Smokeless tobacco: Never   Vaping Use    Vaping status: Never Used   Substance and Sexual Activity    Alcohol use: Yes     Alcohol/week: 7.0 standard drinks of alcohol     Types: 7 Glasses of wine per week    Drug use: Never    Sexual activity: Yes     Partners: Male     Birth control/protection: Condom, None, Partner of same sex       Medications:     Current Outpatient Medications:     ALPRAZolam (XANAX) 0.5 MG tablet, Take 1 tablet by mouth 3 (Three) Times a Day As Needed for Anxiety., Disp: 60 tablet, Rfl: 0    azelastine (ASTEPRO) 0.15 % solution nasal spray, 2 sprays into the nostril(s) as directed by provider Daily As Needed for Rhinitis., Disp: 23 mL, Rfl: 5    cyclobenzaprine (FLEXERIL) 10 MG tablet, Take 1 tablet by mouth At Night As Needed for Muscle Spasms., Disp: 30 tablet, Rfl: 0    estradiol (Estrace) 0.5 MG tablet, Take 1 tablet by mouth Daily., Disp: 90 tablet, Rfl: 4    galcanezumab-gnlm (Emgality) 120 MG/ML auto-injector pen, Inject  under the skin into the appropriate area as directed., Disp: , Rfl:     ibuprofen (ADVIL,MOTRIN) 800 MG tablet, Take 1 tablet by  "mouth Every 8 (Eight) Hours As Needed for Mild Pain., Disp: 90 tablet, Rfl: 0    medroxyPROGESTERone (Provera) 2.5 MG tablet, Take 1 tablet by mouth Daily., Disp: 90 tablet, Rfl: 4    oxyCODONE (ROXICODONE) 5 MG immediate release tablet, 1 tablet Every 12 (Twelve) Hours As Needed., Disp: , Rfl:     rizatriptan (MAXALT) 10 MG tablet, rizatriptan 10 mg tablet, Disp: , Rfl:     sertraline (ZOLOFT) 50 MG tablet, Take 1 tablet by mouth Daily., Disp: 90 tablet, Rfl: 3    topiramate (TOPAMAX) 50 MG tablet, Take 1 tablet by mouth 2 (Two) Times a Day., Disp: 180 tablet, Rfl: 1    zolpidem (AMBIEN) 10 MG tablet, Take 1 tablet by mouth At Night As Needed for Sleep., Disp: 30 tablet, Rfl: 2    Allergies:   No Known Allergies    Objective     Physical Exam:  Vital Signs:   Vitals:    07/03/25 1441 07/03/25 1459   BP: 130/82 144/90  Comment: same  both  arms   Cuff Size:  Adult   Pulse: 70    Resp: 14    SpO2: 99%    Weight: 88 kg (194 lb)    Height: 177.8 cm (70\")    PainSc: 6     PainLoc: Knee      Facility age limit for growth %graciela is 20 years.  Body mass index is 27.84 kg/m².     Physical Exam  Vitals and nursing note reviewed.   Constitutional:       Appearance: Normal appearance.   HENT:      Head: Normocephalic and atraumatic.   Cardiovascular:      Rate and Rhythm: Normal rate and regular rhythm.   Pulmonary:      Effort: Pulmonary effort is normal.      Breath sounds: Normal breath sounds.   Musculoskeletal:         General: Normal range of motion.      Cervical back: Normal range of motion and neck supple.      Right lower leg: No edema.      Left lower leg: No edema.      Comments: Right knee incision and the incision from the robot on her anterior right leg healing well there is still warmth about the knee and mild edema but is normal she has flexion to about 90 degrees no calf tenderness no edema really to speak of of either leg.   Skin:     General: Skin is warm and dry.   Neurological:      General: No focal " deficit present.      Mental Status: She is alert.   Psychiatric:         Mood and Affect: Mood normal.         Behavior: Behavior normal.         Procedures    PHQ-9 Total Score:      Assessment / Plan      Assessment/Plan:   Diagnoses and all orders for this visit:    1. Elevated blood pressure reading (Primary)    2. Vitamin D deficiency  -     Vitamin D,25-Hydroxy; Future  -     Vitamin D,25-Hydroxy    3. Vitamin B 12 deficiency  -     CBC Auto Differential; Future  -     Vitamin B12; Future  -     Vitamin B12  -     CBC Auto Differential    4. Status post right knee replacement    5. High risk medication use  -     Comprehensive Metabolic Panel; Future  -     Comprehensive Metabolic Panel    Blood pressure is a little elevated my recheck but right at the 144 sierra we will continue current medicines as directed and continue rehab she is doing well and understands that she will need to do physical therapy and try to increase her flexion to 120 follow-up with her other doctors as directed    Certainly if she has any symptoms chest pain shortness of breath stroke symptoms go to the ER but follow-up here for regular checkups    Will get blood work today as she said her doctor told her vitamin D level was low so we will check those labs.  Follow-up on those reports within a week or 2      Keep your regular follow-up appointment for November as set sooner if worse or any problems  Assessment & Plan             Follow Up:   Return if symptoms worsen or fail to improve.    I spent 37 minutes caring for Tracey on this date of service. This time includes time spent by me in the following activities:preparing for the visit, reviewing tests, obtaining and/or reviewing a separately obtained history, performing a medically appropriate examination and/or evaluation , counseling and educating the patient/family/caregiver, ordering medications, tests, or procedures, and documenting information in the medical record    Patient or  patient representative verbalized consent for the use of Ambient Listening during the visit with  Alfredito Rocha MD for chart documentation. 7/3/2025  16:40 EDT    Alfredito Rocha MD  [facility]   Portions of note created with Dragon voice recognition technology

## 2025-07-04 LAB
25(OH)D3+25(OH)D2 SERPL-MCNC: 33.8 NG/ML (ref 30–100)
ALBUMIN SERPL-MCNC: 4.3 G/DL (ref 3.8–4.9)
ALP SERPL-CCNC: 93 IU/L (ref 44–121)
ALT SERPL-CCNC: 12 IU/L (ref 0–32)
AST SERPL-CCNC: 15 IU/L (ref 0–40)
BASOPHILS # BLD AUTO: 0.1 X10E3/UL (ref 0–0.2)
BASOPHILS NFR BLD AUTO: 2 %
BILIRUB SERPL-MCNC: 0.4 MG/DL (ref 0–1.2)
BUN SERPL-MCNC: 16 MG/DL (ref 6–24)
BUN/CREAT SERPL: 17 (ref 9–23)
CALCIUM SERPL-MCNC: 9.7 MG/DL (ref 8.7–10.2)
CHLORIDE SERPL-SCNC: 103 MMOL/L (ref 96–106)
CO2 SERPL-SCNC: 21 MMOL/L (ref 20–29)
CREAT SERPL-MCNC: 0.93 MG/DL (ref 0.57–1)
EGFRCR SERPLBLD CKD-EPI 2021: 71 ML/MIN/1.73
EOSINOPHIL # BLD AUTO: 0.1 X10E3/UL (ref 0–0.4)
EOSINOPHIL NFR BLD AUTO: 2 %
ERYTHROCYTE [DISTWIDTH] IN BLOOD BY AUTOMATED COUNT: 11.7 % (ref 11.7–15.4)
GLOBULIN SER CALC-MCNC: 2.4 G/DL (ref 1.5–4.5)
GLUCOSE SERPL-MCNC: 100 MG/DL (ref 70–99)
HCT VFR BLD AUTO: 41.6 % (ref 34–46.6)
HGB BLD-MCNC: 13.3 G/DL (ref 11.1–15.9)
IMM GRANULOCYTES # BLD AUTO: 0 X10E3/UL (ref 0–0.1)
IMM GRANULOCYTES NFR BLD AUTO: 0 %
LYMPHOCYTES # BLD AUTO: 1.8 X10E3/UL (ref 0.7–3.1)
LYMPHOCYTES NFR BLD AUTO: 29 %
MCH RBC QN AUTO: 30.9 PG (ref 26.6–33)
MCHC RBC AUTO-ENTMCNC: 32 G/DL (ref 31.5–35.7)
MCV RBC AUTO: 97 FL (ref 79–97)
MONOCYTES # BLD AUTO: 0.5 X10E3/UL (ref 0.1–0.9)
MONOCYTES NFR BLD AUTO: 8 %
NEUTROPHILS # BLD AUTO: 3.6 X10E3/UL (ref 1.4–7)
NEUTROPHILS NFR BLD AUTO: 59 %
PLATELET # BLD AUTO: 381 X10E3/UL (ref 150–450)
POTASSIUM SERPL-SCNC: 4.3 MMOL/L (ref 3.5–5.2)
PROT SERPL-MCNC: 6.7 G/DL (ref 6–8.5)
RBC # BLD AUTO: 4.31 X10E6/UL (ref 3.77–5.28)
SODIUM SERPL-SCNC: 139 MMOL/L (ref 134–144)
VIT B12 SERPL-MCNC: 341 PG/ML (ref 232–1245)
WBC # BLD AUTO: 6 X10E3/UL (ref 3.4–10.8)

## 2025-08-05 ENCOUNTER — TELEPHONE (OUTPATIENT)
Dept: FAMILY MEDICINE CLINIC | Facility: CLINIC | Age: 60
End: 2025-08-05
Payer: COMMERCIAL

## 2025-08-27 DIAGNOSIS — M62.838 MUSCLE SPASM: ICD-10-CM

## 2025-08-28 RX ORDER — CYCLOBENZAPRINE HCL 10 MG
10 TABLET ORAL NIGHTLY PRN
Qty: 30 TABLET | Refills: 0 | Status: SHIPPED | OUTPATIENT
Start: 2025-08-28